# Patient Record
Sex: FEMALE | Race: OTHER | NOT HISPANIC OR LATINO | URBAN - METROPOLITAN AREA
[De-identification: names, ages, dates, MRNs, and addresses within clinical notes are randomized per-mention and may not be internally consistent; named-entity substitution may affect disease eponyms.]

---

## 2022-04-11 ENCOUNTER — TELEPHONE (OUTPATIENT)
Dept: OBGYN CLINIC | Facility: HOSPITAL | Age: 52
End: 2022-04-11

## 2022-10-07 ENCOUNTER — TELEPHONE (OUTPATIENT)
Dept: OBGYN CLINIC | Facility: HOSPITAL | Age: 52
End: 2022-10-07

## 2022-10-07 NOTE — TELEPHONE ENCOUNTER
Patient is being referred to a hand specialist  Please schedule accordingly  Lt trigger fgr  Patient did have an injection that worked for about a month      [address]   [phone]

## 2022-10-14 ENCOUNTER — TELEPHONE (OUTPATIENT)
Dept: OBGYN CLINIC | Facility: HOSPITAL | Age: 52
End: 2022-10-14

## 2022-10-14 NOTE — TELEPHONE ENCOUNTER
Caller: Patient    Doctor: Fabiana Dan    Reason for call: Patient would like to reschedule the 9am appt for today       Call back#: [phone]

## 2022-10-17 ENCOUNTER — OFFICE VISIT (OUTPATIENT)
Dept: OBGYN CLINIC | Facility: CLINIC | Age: 52
End: 2022-10-17
Payer: COMMERCIAL

## 2022-10-17 VITALS
DIASTOLIC BLOOD PRESSURE: 62 MMHG | WEIGHT: 162 LBS | HEIGHT: 65 IN | BODY MASS INDEX: 26.99 KG/M2 | SYSTOLIC BLOOD PRESSURE: 118 MMHG

## 2022-10-17 DIAGNOSIS — G89.29 CHRONIC RIGHT-SIDED LOW BACK PAIN WITHOUT SCIATICA: ICD-10-CM

## 2022-10-17 DIAGNOSIS — M54.50 CHRONIC RIGHT-SIDED LOW BACK PAIN WITHOUT SCIATICA: ICD-10-CM

## 2022-10-17 DIAGNOSIS — M25.50 ARTHRALGIA, UNSPECIFIED JOINT: ICD-10-CM

## 2022-10-17 DIAGNOSIS — M25.551 BILATERAL HIP PAIN: ICD-10-CM

## 2022-10-17 DIAGNOSIS — G89.29 CHRONIC PAIN OF RIGHT KNEE: ICD-10-CM

## 2022-10-17 DIAGNOSIS — M25.561 CHRONIC PAIN OF RIGHT KNEE: ICD-10-CM

## 2022-10-17 DIAGNOSIS — M25.552 BILATERAL HIP PAIN: ICD-10-CM

## 2022-10-17 DIAGNOSIS — M65.311 TRIGGER FINGER OF RIGHT THUMB: ICD-10-CM

## 2022-10-17 PROCEDURE — 99244 OFF/OP CNSLTJ NEW/EST MOD 40: CPT | Performed by: EMERGENCY MEDICINE

## 2022-10-17 NOTE — PROGRESS NOTES
Assessment/Plan:    Diagnoses and all orders for this visit:    Chronic right-sided low back pain without sciatica  -     Ambulatory Referral to Orthopedic Surgery    Bilateral hip pain  -     Ambulatory Referral to Orthopedic Surgery    Chronic pain of right knee  -     Ambulatory Referral to Orthopedic Surgery    Trigger finger of right thumb  -     Ambulatory Referral to Orthopedic Surgery    Arthralgia, unspecified joint  -     Ambulatory Referral to Orthopedic Surgery    It appears that the patient's symptoms could be related to a systemic cause such as autoimmune issue  She does have a rheumatology appointment next week as scheduled  At this point in time I have recommended regular short-term use of anti-inflammatories to help with her pain  Reviewed x-rays showing mild degenerative changes and no obvious cause for the patient's pain  Return in about 4 weeks (around 11/14/2022), or if symptoms worsen or fail to improve  Chief Complaint:     Chief Complaint   Patient presents with   • Right Shoulder - Pain   • Spine - Pain   • Right Hip - Pain       Subjective:   Patient ID: Lucy Julian is a 46 y o  female  NP presents referred by PCP Dr Samanta Loredo for multiple issues ongoing for >1 year  She notes and bilateral hip and groin pain  Lab work was previously obtained showing a positive FLOR and anti micro some all antibody  She is scheduled to see Rheumatology next week  She has tried Aleve or IBU once per week but not consistently  Review of Systems    The following portions of the patient's chart were reviewed and updated as appropriate: Allergy:  No Known Allergies    History reviewed  No pertinent past medical history  History reviewed  No pertinent surgical history      Social History     Socioeconomic History   • Marital status: Unknown     Spouse name: Not on file   • Number of children: Not on file   • Years of education: Not on file   • Highest education level: Not on file Occupational History   • Not on file   Tobacco Use   • Smoking status: Current Every Day Smoker     Packs/day: 0 50     Types: Cigarettes   • Smokeless tobacco: Current User   Substance and Sexual Activity   • Alcohol use: Not on file   • Drug use: Not on file   • Sexual activity: Not on file   Other Topics Concern   • Not on file   Social History Narrative   • Not on file     Social Determinants of Health     Financial Resource Strain: Not on file   Food Insecurity: Not on file   Transportation Needs: Not on file   Physical Activity: Not on file   Stress: Not on file   Social Connections: Not on file   Intimate Partner Violence: Not on file   Housing Stability: Not on file       History reviewed  No pertinent family history  Medications:    Current Outpatient Medications:   •  cyclobenzaprine (FLEXERIL) 5 mg tablet, Take 1 tablet (5 mg total) by mouth as needed in the morning and 1 tablet (5 mg total) as needed at noon and 1 tablet (5 mg total) as needed in the evening for muscle spasms  , Disp: 30 tablet, Rfl: 0  •  cyclobenzaprine (FLEXERIL) 5 mg tablet, Take 1 tablet (5 mg total) by mouth 3 (three) times a day as needed for muscle spasms for up to 30 doses, Disp: 30 tablet, Rfl: 0  •  hydrocortisone (ANUSOL-HC) 2 5 % rectal cream, Apply topically 2 (two) times a day, Disp: 60 g, Rfl: 3  •  levothyroxine 100 mcg tablet, TAKE 1 TABLET (100 MCG TOTAL) BY MOUTH IN THE MORNING , Disp: 90 tablet, Rfl: 0  •  meloxicam (Mobic) 15 mg tablet, Take 1 tablet (15 mg total) by mouth daily, Disp: 90 tablet, Rfl: 3  •  naproxen (NAPROSYN) 500 mg tablet, Take 1 tablet (500 mg total) by mouth 2 (two) times a day with meals, Disp: 20 tablet, Rfl: 0    There is no problem list on file for this patient        Objective:  /62   Ht 5' 5" (1 651 m)   Wt 73 5 kg (162 lb)   BMI 26 96 kg/m²     Right Hip Exam     Comments:  Hip and groin pain with external rotation      Back Exam     Range of Motion   The patient has normal back ROM  Other   Toe walk: normal  Heel walk: normal      Right Shoulder Exam     Range of Motion   Active abduction: normal   External rotation: normal   Internal rotation 0 degrees: abnormal     Muscle Strength   External rotation: 4/5   Supraspinatus: 5/5     Tests   Drop arm: negative      Left Shoulder Exam     Range of Motion   The patient has normal left shoulder ROM  Physical Exam      Neurologic Exam    Procedures    I have personally reviewed the written report of the pertinent studies  Labs reviewed showing FLOR and anti-microsomal ab    4/13/22  XR HIP 2 TO 3 VIEWS WITH PELVIS RIGHT    Anatomical Region Laterality Modality   Hip right Digital Radiography   Pelvis -- --   Thigh -- --   Lower Extremity -- --       Impression    IMPRESSION:     1  Lumbar lordosis is normal with preserved vertebral corpus heights and disc   spaces  Mild degenerative changes are noted  Facet joint arthrosis particularly   at lower lumbar levels  Lumbosacral alignment is normal  Nonobstructive   abdominal bowel gas pattern is present  2  No acute fracture or dislocation in the pelvis  SI joints unremarkable  Right   hip joint is intact with mild degenerative changes     3  Left hip joint is intact with mild degenerative changes     4  No acute fracture or dislocation in the right knee which shows mild   degenerative changes  Patella is in proper position with mild contour   irregularity and osteophyte formations superiorly

## 2022-10-17 NOTE — LETTER
October 17, 2022     Kaye Mariscal MD  [address] [address] [place]     Patient: Carolyn Chopra   YOB: 1970   Date of Visit: 10/17/2022       Dear Dr Tia Duenas: Thank you for referring Carolyn Chopra to me for evaluation  Below are the relevant portions of my assessment and plan of care  If you have questions, please do not hesitate to call me  I look forward to following Heather along with you           Sincerely,        Armand Smith MD        CC: No Recipients

## 2022-10-20 NOTE — PROGRESS NOTES
No chief complaint on file  Referring Provider  Christiano Diaz Md  [address] [facility],  [address] [place] [address]     History of Present Illness:   Iveth Winters is a 46 y o  female with hypothyroidism seen in consultation at the request of ***  Levothyroxine Synthroid Levoxyl Tirosint    S/sx: palpitations, tremor, changes in hair/skin/nails, changes in menstrual cycle, diarrhea, constipation, sleep disturbance, anxiety/nervous feelings/mood changes, blurry vision or double vision  Surgery to the neck    Neck: changes in neck, changes in voice, dysphagia  Radiation exposure to head/neck/chest  Medications impacting the thyroid  Illnesses    Contrast  Fhx:   thyroid disorder-     There is no problem list on file for this patient  No past medical history on file  No past surgical history on file  No family history on file  Social History     Tobacco Use   • Smoking status: Current Every Day Smoker     Packs/day: 0 50     Types: Cigarettes   • Smokeless tobacco: Current User   Substance Use Topics   • Alcohol use: Not on file     No Known Allergies      Current Outpatient Medications:   •  cyclobenzaprine (FLEXERIL) 5 mg tablet, Take 1 tablet (5 mg total) by mouth as needed in the morning and 1 tablet (5 mg total) as needed at noon and 1 tablet (5 mg total) as needed in the evening for muscle spasms  , Disp: 30 tablet, Rfl: 0  •  cyclobenzaprine (FLEXERIL) 5 mg tablet, Take 1 tablet (5 mg total) by mouth 3 (three) times a day as needed for muscle spasms for up to 30 doses, Disp: 30 tablet, Rfl: 0  •  hydrocortisone (ANUSOL-HC) 2 5 % rectal cream, Apply topically 2 (two) times a day, Disp: 60 g, Rfl: 3  •  levothyroxine 100 mcg tablet, TAKE 1 TABLET (100 MCG TOTAL) BY MOUTH IN THE MORNING , Disp: 90 tablet, Rfl: 0  •  meloxicam (Mobic) 15 mg tablet, Take 1 tablet (15 mg total) by mouth daily, Disp: 90 tablet, Rfl: 3  •  naproxen (NAPROSYN) 500 mg tablet, Take 1 tablet (500 mg total) by mouth 2 (two) times a day with Patient called to follow up on oral therapy     Drug:   Gleevec    Date started  2/25/2022    Regimen:   400mg daily    Compliance:   With food? Yes   Taking same time every day? Yes, between 3:30-4:30pm  Keeping in original bottle? Yes  Taking with full glass of water? Yes  Compliant with allopurinol? Please see comments below.     Toxicity assessment:   Palpitations, abnormal heart rhythm, chest pain, sudden weight gain, dizziness--No  Fatigue--Yes; Patient still reports significant fatigue. She states she did not take her dose of allopurinol to see if that helped her fatigue, which she states it did. Patient states she will continue to take the allopurinol as directed.   Sensitivity to sun--No  Diarrhea--Patient reports having one episode of loose stool after breakfast today. Denies any fevers or any other GI symptoms. Patient notified to call office if increase in loose stool. Patient verbalized understanding.   Nausea--No  Flu like symptoms--No  Rash?--No  Swelling? Patient still reports intermittent eyelid swelling. Patient denies any worsening of symptoms. Patient states this side effect occurred the last time patient was on Gleevec. Patient denies swelling elsewhere.   Mood Change--No    Questions/Concerns:     Patient reports some joint pain over the weekend that was relieved with massage and warm compresses. Denies any joint pain today. Patient was encouraged to call if fatigue, eyelid swelling, diarrhea, joint pain or any other new symptoms develop or worsen. Patient verbalized understanding.     Will postpone to 4/4/22.   meals, Disp: 20 tablet, Rfl: 0  Review of Systems    Physical Exam:  There is no height or weight on file to calculate BMI  There were no vitals taken for this visit  Wt Readings from Last 3 Encounters:   10/17/22 73 5 kg (162 lb)   10/06/22 73 5 kg (162 lb)   04/11/22 76 7 kg (169 lb)       GEN: NAD  E/n/m nl facies, hearing intact bilat, tongue midline, lips nl  Eyes: no stare or proptosis, nl lids and conjunctiva, EOMI  Neck: trachea midline, thyroid NT to palpation, nl in size, no nodules or neck masses noted, no cervical LAD  CV; heart reg rate s1s2 nl, no m/r/g appreciated, no BABAK  Resp: CTAB, good effort  Ab+BS  Neuro: no tremor, 2+ DTRs in BUE  MS: no c/c in digits, moves all 4 ext, nl muscle bulk, gait nl  Skin: warm and dry, no palmar erythema  Ext: no c/c in digits, no edema bilaterally, 2+ DP and PT pulses bilat, no breaks in skin/ulcers on feet, sensation intact to monofilament testing on plantar surfaces bilat  Psych: nl mood and affect, no gross lapses in memory    DATA:  Labs:       Lab Results   Component Value Date    TSH 1 59 10/11/2022         Radiology    Impression:  No diagnosis found  Plan:    There are no diagnoses linked to this encounter  There are no diagnoses linked to this encounter  1  Hypothyroidism, likely due to ***:      Discussed with the patient and all questioned fully answered  She will call me if any problems arise          Karen Conway MD

## 2022-10-21 ENCOUNTER — CONSULT (OUTPATIENT)
Dept: ENDOCRINOLOGY | Facility: HOSPITAL | Age: 52
End: 2022-10-21
Payer: COMMERCIAL

## 2022-10-21 VITALS
DIASTOLIC BLOOD PRESSURE: 70 MMHG | HEART RATE: 83 BPM | WEIGHT: 165 LBS | BODY MASS INDEX: 27.49 KG/M2 | SYSTOLIC BLOOD PRESSURE: 120 MMHG | HEIGHT: 65 IN

## 2022-10-21 DIAGNOSIS — E06.3 HYPOTHYROIDISM DUE TO HASHIMOTO'S THYROIDITIS: Primary | ICD-10-CM

## 2022-10-21 DIAGNOSIS — N95.1 PERIMENOPAUSAL: ICD-10-CM

## 2022-10-21 DIAGNOSIS — E03.8 HYPOTHYROIDISM DUE TO HASHIMOTO'S THYROIDITIS: Primary | ICD-10-CM

## 2022-10-21 DIAGNOSIS — R76.8 THYROID ANTIBODY POSITIVE: ICD-10-CM

## 2022-10-21 PROCEDURE — 99203 OFFICE O/P NEW LOW 30 MIN: CPT | Performed by: STUDENT IN AN ORGANIZED HEALTH CARE EDUCATION/TRAINING PROGRAM

## 2022-10-21 NOTE — PROGRESS NOTES
CC: Evaluation of elevated anti-TPO titers     Referring Provider  Amberly Jean-Baptiste Md  [address] [facility],  [address] [place] [address]      History of Present Illness:   Patience Mendes is a 46 y o  female with hypothyroidism seen in consultation at the request of her PCP, Dr João Barillas  In a recent workup for arthralgias and myalgias, she was incidentally found to have elevated anti-TPO titers (319) and was subsequently referred to us for further evaluation  The patient endorses a history of hypothyroidism, first diagnosed approximately 15 years ago; she does not have access to these records, but reports that her TSH and thyroid hormone levels were found to be abnormal  Her PCP at the time initiated her on low-dose levothyroxine supplementation; this has steadily been increased over the years, and she has been at her current dose of 100 mcg daily for the past 1-2 years  She reports good compliance with her levothyroxine, and is taking it appropriately (waiting 30 minutes before eating, without iron-containing supplements, etc)  At the present time, her symptoms include palpitations, irritability, brain fog and difficulty concentrating, dry skin, hair loss, temperature intolerance (primarily cold intolerance, but occasional hot flashes as well), menstrual irregularity, and arthralgias/myalgias  She denies any constipation or diarrhea  She denies any changes in her neck, dysphagia, or compressive symptoms  She has no history of radiation treatment to the head/neck/chest, takes no medications that would affect her thyroid, and reports no recent illnesses or contrast exposure  Of note, the patient does have a strong family history of thyroid disease  Her father had thyroid cancer, though she is unsure of the type  Additionally, several of her sisters have a history of hypothyroidism as well  Patient Active Problem List   Diagnosis   • Hypothyroidism due to Hashimoto's thyroiditis      History reviewed   No pertinent past medical history  Past Surgical History:   Procedure Laterality Date   •  SECTION      1/3/1991   • REPEAT  SECTION      1992, 12/3/1997      Family History   Problem Relation Age of Onset   • Diabetes Mother    • Heart disease Mother    • Hypertension Mother    • Cataracts Mother    • Thyroid cancer Father    • Heart disease Sister    • Hypertension Sister    • Thyroid disease Sister    • Heart disease Sister    • Hypertension Sister    • Thyroid disease Sister    • Heart disease Sister    • Hypertension Sister    • Thyroid disease Sister    • Heart disease Brother    • Hypertension Brother    • Heart disease Brother    • Hypertension Brother    • Heart disease Brother    • Hypertension Brother    • Heart disease Brother    • Hypertension Brother    • Heart disease Brother    • Hypertension Brother      Social History     Tobacco Use   • Smoking status: Current Every Day Smoker     Packs/day: 0 50     Types: Cigarettes   • Smokeless tobacco: Current User   Substance Use Topics   • Alcohol use: Not on file     No Known Allergies      Current Outpatient Medications:   •  cyclobenzaprine (FLEXERIL) 5 mg tablet, Take 1 tablet (5 mg total) by mouth 3 (three) times a day as needed for muscle spasms for up to 30 doses, Disp: 30 tablet, Rfl: 0  •  hydrocortisone (ANUSOL-HC) 2 5 % rectal cream, Apply topically 2 (two) times a day, Disp: 60 g, Rfl: 3  •  levothyroxine 100 mcg tablet, TAKE 1 TABLET (100 MCG TOTAL) BY MOUTH IN THE MORNING , Disp: 90 tablet, Rfl: 0  •  meloxicam (Mobic) 15 mg tablet, Take 1 tablet (15 mg total) by mouth daily, Disp: 90 tablet, Rfl: 3  •  naproxen (NAPROSYN) 500 mg tablet, Take 1 tablet (500 mg total) by mouth 2 (two) times a day with meals, Disp: 20 tablet, Rfl: 0  •  cyclobenzaprine (FLEXERIL) 5 mg tablet, Take 1 tablet (5 mg total) by mouth as needed in the morning and 1 tablet (5 mg total) as needed at noon and 1 tablet (5 mg total) as needed in the evening for muscle spasms  (Patient not taking: Reported on 10/21/2022), Disp: 30 tablet, Rfl: 0     Review of Systems   Constitutional: Positive for appetite change and fatigue  Negative for unexpected weight change  HENT: Negative for sore throat, trouble swallowing and voice change  Eyes: Negative for visual disturbance  Respiratory: Positive for cough and shortness of breath  At baseline   Cardiovascular: Positive for palpitations  Negative for chest pain  Gastrointestinal: Negative for abdominal pain, constipation, diarrhea, nausea and vomiting  Endocrine: Positive for polyphagia  Negative for polydipsia and polyuria  Genitourinary: Negative for difficulty urinating and frequency  Musculoskeletal: Positive for arthralgias and myalgias  Diffuse   Skin: Negative for rash  Neurological: Negative for tremors, weakness, light-headedness and headaches  Psychiatric/Behavioral: Positive for dysphoric mood  Physical Exam:  Body mass index is 27 46 kg/m²  /70   Pulse 83   Ht 5' 5" (1 651 m)   Wt 74 8 kg (165 lb)   BMI 27 46 kg/m²    Wt Readings from Last 3 Encounters:   10/21/22 74 8 kg (165 lb)   10/17/22 73 5 kg (162 lb)   10/06/22 73 5 kg (162 lb)     Physical Exam  Vitals reviewed  Constitutional:       General: She is not in acute distress  Appearance: Normal appearance  She is not ill-appearing  HENT:      Head: Normocephalic  Eyes:      Extraocular Movements: Extraocular movements intact  Conjunctiva/sclera: Conjunctivae normal       Pupils: Pupils are equal, round, and reactive to light  Neck:      Thyroid: No thyroid mass, thyromegaly or thyroid tenderness  Cardiovascular:      Rate and Rhythm: Normal rate and regular rhythm  Heart sounds: Normal heart sounds  No murmur heard  Pulmonary:      Effort: Pulmonary effort is normal  No respiratory distress  Breath sounds: Normal breath sounds  Comments: Chronic cough  Abdominal:      General: Abdomen is flat  Bowel sounds are normal  There is no distension  Palpations: Abdomen is soft  Tenderness: There is no abdominal tenderness  Musculoskeletal:      Cervical back: Neck supple  No tenderness  Right lower leg: No edema  Left lower leg: No edema  Skin:     General: Skin is warm and dry  Capillary Refill: Capillary refill takes less than 2 seconds  Neurological:      General: No focal deficit present  Mental Status: She is alert and oriented to person, place, and time  Psychiatric:         Mood and Affect: Mood normal          Behavior: Behavior normal            DATA:  Labs: Anti-TPO: 319    Lab Results   Component Value Date    TSH 1 59 10/11/2022     Radiology    Impression:  1  Hypothyroidism due to Hashimoto's thyroiditis    2  Thyroid antibody positive    3  Perimenopausal       Plan:    Heather was seen today for hypothyroidism  Diagnoses and all orders for this visit:    Hypothyroidism due to Hashimoto's thyroiditis    Thyroid antibody positive  -     Ambulatory Referral to Endocrinology    Perimenopausal      1  Hypothyroidism secondary to hashimoto thyroditis:-patient's long-standing history of hypothyroidism, elevated TPO-Ab does not contribute to any of her symptoms, unless her thyroid control was inadequate  Patient has been euthyroid for the past 2 years on current dose of levothyroxine 100mcg  2  Perimenopause:- We did discuss that several of her symptoms could be related to perimenopause; in this case, further testing would not necessarily be helpful as labs are generally abnormal with high or low Estradiol/FSH during the perimenopausal phase  Perimenopause if associated with irregular menses, muscle aches, hair loss, fatigue, mood instability/depression/anxiety and so forth  Patient does not want to consider any medical or hormonal replacement for these symptoms currently  Therefore we do not have much to offer here    However, she should continue her work-up with her PCP and other specialists as indicated  We advised her to continue her current dose of levothyroxine (100 mcg daily); additionally, she should continue to have her TSH checked annually, by her PCP or otherwise  She may follow-up with us on an as-needed basis in the future  Discussed with the patient and all questioned fully answered  She will call me if any problems arise  I interviewed, took the history and examined the patient  I discussed the case with the Students and reviewed the Student's note  I have edited the note and changed plan as outlined above  I have placed the orders  I was present in the clinic and examined the patient      Bandar Mccray MD 10/24/22      Bandar Mccray MD

## 2022-10-24 PROBLEM — E03.8 HYPOTHYROIDISM DUE TO HASHIMOTO'S THYROIDITIS: Status: ACTIVE | Noted: 2022-10-24

## 2022-10-24 PROBLEM — E06.3 HYPOTHYROIDISM DUE TO HASHIMOTO'S THYROIDITIS: Status: ACTIVE | Noted: 2022-10-24

## 2022-11-28 ENCOUNTER — DOCUMENTATION (OUTPATIENT)
Dept: ENDOCRINOLOGY | Facility: HOSPITAL | Age: 52
End: 2022-11-28

## 2023-02-17 ENCOUNTER — HOSPITAL ENCOUNTER (OUTPATIENT)
Dept: CT IMAGING | Facility: HOSPITAL | Age: 53
End: 2023-02-17

## 2023-02-17 DIAGNOSIS — F17.200 SMOKER: ICD-10-CM

## 2023-03-28 ENCOUNTER — HOSPITAL ENCOUNTER (OUTPATIENT)
Dept: MAMMOGRAPHY | Facility: MEDICAL CENTER | Age: 53
Discharge: HOME | End: 2023-03-28

## 2023-03-28 ENCOUNTER — HOSPITAL ENCOUNTER (OUTPATIENT)
Dept: BONE DENSITY | Facility: MEDICAL CENTER | Age: 53
Discharge: HOME | End: 2023-03-28

## 2023-03-28 VITALS — HEIGHT: 66 IN | WEIGHT: 163 LBS | BODY MASS INDEX: 26.2 KG/M2

## 2023-03-28 DIAGNOSIS — Z12.31 OTHER SCREENING MAMMOGRAM: ICD-10-CM

## 2023-03-28 DIAGNOSIS — Z78.0 POSTMENOPAUSAL: ICD-10-CM

## 2023-08-23 ENCOUNTER — HOSPITAL ENCOUNTER (EMERGENCY)
Facility: HOSPITAL | Age: 53
Discharge: HOME | End: 2023-08-23
Attending: EMERGENCY MEDICINE
Payer: COMMERCIAL

## 2023-08-23 ENCOUNTER — APPOINTMENT (EMERGENCY)
Dept: RADIOLOGY | Facility: HOSPITAL | Age: 53
End: 2023-08-23
Payer: COMMERCIAL

## 2023-08-23 VITALS
SYSTOLIC BLOOD PRESSURE: 139 MMHG | WEIGHT: 165 LBS | HEART RATE: 75 BPM | OXYGEN SATURATION: 99 % | TEMPERATURE: 98.1 F | DIASTOLIC BLOOD PRESSURE: 71 MMHG | BODY MASS INDEX: 26.63 KG/M2 | RESPIRATION RATE: 18 BRPM

## 2023-08-23 DIAGNOSIS — M25.562 LEFT KNEE PAIN: Primary | ICD-10-CM

## 2023-08-23 PROCEDURE — 73564 X-RAY EXAM KNEE 4 OR MORE: CPT

## 2023-08-23 PROCEDURE — 99283 EMERGENCY DEPT VISIT LOW MDM: CPT

## 2023-08-23 RX ORDER — NAPROXEN 500 MG/1
500 TABLET ORAL ONCE
Status: COMPLETED | OUTPATIENT
Start: 2023-08-23 | End: 2023-08-23

## 2023-08-23 RX ORDER — NAPROXEN 500 MG/1
500 TABLET ORAL 2 TIMES DAILY WITH MEALS
Qty: 30 TABLET | Refills: 0 | Status: SHIPPED | OUTPATIENT
Start: 2023-08-23

## 2023-08-23 RX ORDER — ACETAMINOPHEN 325 MG/1
975 TABLET ORAL ONCE
Status: COMPLETED | OUTPATIENT
Start: 2023-08-23 | End: 2023-08-23

## 2023-08-23 RX ADMIN — ACETAMINOPHEN 975 MG: 325 TABLET, FILM COATED ORAL at 22:54

## 2023-08-23 RX ADMIN — NAPROXEN 500 MG: 500 TABLET ORAL at 22:54

## 2023-08-23 NOTE — Clinical Note
Parvin Wilkinson was seen and treated in our emergency department on 8/23/2023. Diagnosis:     Bernard  is off the rest of the shift today. She may return on this date: 08/26/2023         If you have any questions or concerns, please don't hesitate to call.       Mila Bocanegra MD    ______________________________           _______________          _______________  Hospital Representative                              Date                                Time

## 2023-08-23 NOTE — Clinical Note
Mary Garrido was seen and treated in our emergency department on 8/23/2023. Diagnosis:     Bernard  is off the rest of the shift today. She may return on this date: 08/24/2023         If you have any questions or concerns, please don't hesitate to call.       Norma Romero MD    ______________________________           _______________          _______________  Hospital Representative                              Date                                Time

## 2023-08-24 NOTE — DISCHARGE INSTRUCTIONS
Please keep your knee immobilized for comfort. You may apply ice for swelling. You may take naproxen once every 12 hours for pain, do not take advil or aleve with this medicine. You may also take tylenol every 4 hours as needed. We are referring you to orthopedics. You should receive a call in 2-3 days to schedule an appointment. If you do not hear from them, the phone number is listed above.

## 2023-08-24 NOTE — ED PROVIDER NOTES
History  Chief Complaint   Patient presents with   • Knee Pain     Pt heard pop in left knee. Now has pain and can't bend it. 42-year-old female past medical history of hypertension hypothyroidism presents ED complaining of left knee pain. Patient states that she was dancing 2 days ago following a [place] [facility] [place]. She had a minor injury to her left knee, she does not recall exactly the mechanism but felt mild pain. Was seen by a doctor there in [facility] [place], had x-rays done and was told that it was likely ligamentous injury and that she would need to have an MRI of her pain did not improve in a couple weeks. Patient just landed at [facility] prior to presenting to the ED and while there, her luggage was about to fall off the escalator when she lunged to catch it, she felt an acute pop in her left knee and has not been able to bear weight since. She is complaining of severe pain. Her pain is anterior and lateral.  No history of steroid use or antibiotics recently. No history of injuries to this knee prior. She has not taken any medication prior to presenting to the ED for pain control. Prior to Admission Medications   Prescriptions Last Dose Informant Patient Reported? Taking? buPROPion (Wellbutrin SR) 150 mg 12 hr tablet   No No   Sig: Take 1 tablet (150 mg total) by mouth 2 (two) times a day   cyclobenzaprine (FLEXERIL) 5 mg tablet   No No   Sig: Take 1 tablet (5 mg total) by mouth as needed in the morning and 1 tablet (5 mg total) as needed at noon and 1 tablet (5 mg total) as needed in the evening for muscle spasms.    Patient not taking: Reported on 10/21/2022   cyclobenzaprine (FLEXERIL) 5 mg tablet   No No   Sig: Take 1 tablet (5 mg total) by mouth 3 (three) times a day as needed for muscle spasms for up to 30 doses   hydrocortisone (ANUSOL-HC) 2.5 % rectal cream   No No   Sig: Apply topically 2 (two) times a day   levothyroxine 100 mcg tablet   No No   Sig: Take 1 tablet (100 mcg total) by mouth daily meloxicam (Mobic) 15 mg tablet   No No   Sig: Take 1 tablet (15 mg total) by mouth daily   naproxen (NAPROSYN) 500 mg tablet   No No   Sig: Take 1 tablet (500 mg total) by mouth 2 (two) times a day with meals   phentermine (ADIPEX-P) 37.5 MG tablet   No No   Sig: Take 1 tablet (37.5 mg total) by mouth daily   tobramycin (TOBREX) 0.3 % SOLN   No No   Sig: Administer 1 drop to both eyes every 4 (four) hours while awake   varenicline 0.5 MG X 11 & 1 MG X 42 tablet therapy pack   No No   Sig: TAKE 0.5MG DAILY FOR 3 DAYS, THEN 0.5MG TWICE A DAY FOR 4 DAYS, THEN 1 MG TWICE A DAY FOR 21 DAYS.       Facility-Administered Medications: None       Past Medical History:   Diagnosis Date   • Disease of thyroid gland        Past Surgical History:   Procedure Laterality Date   • AUGMENTATION MAMMAPLASTY Bilateral 2017    saline   •  SECTION      1/3/1991   • REPEAT  SECTION      1992, 12/3/1997       Family History   Problem Relation Age of Onset   • Diabetes Mother    • Heart disease Mother    • Hypertension Mother    • Cataracts Mother    • Thyroid cancer Father    • Heart disease Sister    • Hypertension Sister    • Thyroid disease Sister    • Heart disease Sister    • Hypertension Sister    • Thyroid disease Sister    • Heart disease Sister    • Hypertension Sister    • Thyroid disease Sister    • No Known Problems Daughter    • No Known Problems Maternal Grandmother    • No Known Problems Maternal Grandfather    • No Known Problems Paternal Grandmother    • No Known Problems Paternal Grandfather    • Heart disease Brother    • Hypertension Brother    • Heart disease Brother    • Hypertension Brother    • Heart disease Brother    • Hypertension Brother    • Heart disease Brother    • Hypertension Brother    • Heart disease Brother    • Hypertension Brother    • No Known Problems Son    • No Known Problems Son    • No Known Problems Maternal Aunt    • No Known Problems Maternal Aunt    • No Known Problems Maternal Aunt    • No Known Problems Paternal Aunt    • No Known Problems Paternal Aunt      I have reviewed and agree with the history as documented. E-Cigarette/Vaping   • E-Cigarette Use Never User      E-Cigarette/Vaping Substances   • Nicotine No    • THC No    • CBD No    • Flavoring No    • Other No    • Unknown No      Social History     Tobacco Use   • Smoking status: Every Day     Packs/day: 1.00     Years: 30.00     Total pack years: 30.00     Types: Cigarettes   • Smokeless tobacco: Current   Vaping Use   • Vaping Use: Never used        Review of Systems   Constitutional: Negative for chills and fever. HENT: Negative for ear pain and sore throat. Eyes: Negative for pain and visual disturbance. Respiratory: Negative for cough and shortness of breath. Cardiovascular: Negative for chest pain and palpitations. Gastrointestinal: Negative for abdominal pain and vomiting. Genitourinary: Negative for dysuria and hematuria. Musculoskeletal: Positive for arthralgias. Negative for back pain. Skin: Negative for color change and rash. Neurological: Negative for seizures and syncope. All other systems reviewed and are negative. Physical Exam  ED Triage Vitals   Temperature Pulse Respirations Blood Pressure SpO2   08/23/23 2215 08/23/23 2215 08/23/23 2215 08/23/23 2215 08/23/23 2215   98.1 °F (36.7 °C) 75 18 139/71 99 %      Temp src Heart Rate Source Patient Position - Orthostatic VS BP Location FiO2 (%)   -- -- -- -- --             Pain Score       08/23/23 2254       6             Orthostatic Vital Signs  Vitals:    08/23/23 2215   BP: 139/71   Pulse: 75       Physical Exam  Vitals and nursing note reviewed. Constitutional:       General: She is not in acute distress. Appearance: She is well-developed. HENT:      Head: Normocephalic and atraumatic. Eyes:      Conjunctiva/sclera: Conjunctivae normal.   Cardiovascular:      Rate and Rhythm: Normal rate and regular rhythm. Heart sounds: No murmur heard. Pulmonary:      Effort: Pulmonary effort is normal. No respiratory distress. Breath sounds: Normal breath sounds. Abdominal:      Palpations: Abdomen is soft. Tenderness: There is no abdominal tenderness. Musculoskeletal:         General: No swelling. Cervical back: Neck supple. Legs:       Comments: Tenderness to palpation of the L knee tot he L aspect and anterior superior aspect. Patient's most comfortable position is extended in a recliner chair. Pain significantly worse with passive flexion. Unable to preform anterior/posterior drawer test secondary to significant pain with manipulation. No displaced patella, no bony tenderness. Skin:     General: Skin is warm and dry. Capillary Refill: Capillary refill takes less than 2 seconds. Neurological:      Mental Status: She is alert. Psychiatric:         Mood and Affect: Mood normal.         ED Medications  Medications   naproxen (NAPROSYN) tablet 500 mg (500 mg Oral Given 8/23/23 2254)   acetaminophen (TYLENOL) tablet 975 mg (975 mg Oral Given 8/23/23 2254)       Diagnostic Studies  Results Reviewed     None                 XR knee 4+ vw left injury    (Results Pending)         Procedures  Procedures      ED Course                                       Medical Decision Making  60-year-old female past medical history of hypertension hypothyroidism presents ED complaining of left knee pain. DDx: Ligamentous injury of the knee (ACL vs PCL), meniscal injury (lateral), vs tendon rupture (less likely). X-rays completed and unremarkable for obvious osseous abnormality. Patient placed in a knee immobilizer and provided with crutches for ambulation. Referred patient to orthopedic surgery for follow-up of likely ligamentous knee injury. Discussed pain control with applying ice, resting, naproxen and Tylenol as needed.   Discussed strict return precautions to the ED for any worsening pain, redness around the area, fevers or chills or any other concerning symptoms. Patient understanding discharged home with son to follow-up with orthopedic surgery. Amount and/or Complexity of Data Reviewed  Radiology: ordered. Risk  OTC drugs. Prescription drug management. Disposition  Final diagnoses:   Left knee pain     Time reflects when diagnosis was documented in both MDM as applicable and the Disposition within this note     Time User Action Codes Description Comment    8/23/2023 11:18 PM Shamar Sin Add [W01.639] Left knee pain       ED Disposition     ED Disposition   Discharge    Condition   Stable    Date/Time   Wed Aug 23, 2023 11:23 PM    Comment   Bernard Roblero discharge to home/self care. Follow-up Information     Follow up With Specialties Details Why Contact Info Additional [address] [facility] Orthopedic Surgery Schedule an appointment as soon as possible for a visit   [address] [facility], [address] [place], [place], [address]  Use Entrance A           Discharge Medication List as of 8/23/2023 11:23 PM      START taking these medications    Details   !! naproxen (Naprosyn) 500 mg tablet Take 1 tablet (500 mg total) by mouth 2 (two) times a day with meals, Starting Wed 8/23/2023, Normal       !! - Potential duplicate medications found. Please discuss with provider. CONTINUE these medications which have NOT CHANGED    Details   buPROPion (Wellbutrin SR) 150 mg 12 hr tablet Take 1 tablet (150 mg total) by mouth 2 (two) times a day, Starting Tue 4/18/2023, Normal      !! cyclobenzaprine (FLEXERIL) 5 mg tablet Take 1 tablet (5 mg total) by mouth as needed in the morning and 1 tablet (5 mg total) as needed at noon and 1 tablet (5 mg total) as needed in the evening for muscle spasms. , Starting Tue 5/24/2022, Normal      !! cyclobenzaprine (FLEXERIL) 5 mg tablet Take 1 tablet (5 mg total) by mouth 3 (three) times a day as needed for muscle spasms for up to 30 doses, Starting Thu 10/6/2022, Normal      hydrocortisone (ANUSOL-HC) 2.5 % rectal cream Apply topically 2 (two) times a day, Starting u 10/6/2022, Normal      levothyroxine 100 mcg tablet Take 1 tablet (100 mcg total) by mouth daily, Starting u 6/1/2023, Normal      meloxicam (Mobic) 15 mg tablet Take 1 tablet (15 mg total) by mouth daily, Starting Mon 4/11/2022, Normal      !! naproxen (NAPROSYN) 500 mg tablet Take 1 tablet (500 mg total) by mouth 2 (two) times a day with meals, Starting Thu 10/6/2022, Normal      phentermine (ADIPEX-P) 37.5 MG tablet Take 1 tablet (37.5 mg total) by mouth daily, Starting Mon 4/10/2023, Until Sun 7/9/2023, Normal      tobramycin (TOBREX) 0.3 % SOLN Administer 1 drop to both eyes every 4 (four) hours while awake, Starting Thu 6/8/2023, Normal      varenicline 0.5 MG X 11 & 1 MG X 42 tablet therapy pack TAKE 0.5MG DAILY FOR 3 DAYS, THEN 0.5MG TWICE A DAY FOR 4 DAYS, THEN 1 MG TWICE A DAY FOR 21 DAYS., Normal       !! - Potential duplicate medications found. Please discuss with provider. PDMP Review     None           ED Provider  Attending physically available and evaluated Bernard Roblero. I managed the patient along with the ED Attending.     Electronically Signed by         Haseeb Bennett MD  08/24/23 6371

## 2023-08-25 ENCOUNTER — OFFICE VISIT (OUTPATIENT)
Dept: OBGYN CLINIC | Facility: CLINIC | Age: 53
End: 2023-08-25
Payer: COMMERCIAL

## 2023-08-25 VITALS
WEIGHT: 165 LBS | BODY MASS INDEX: 26.52 KG/M2 | HEIGHT: 66 IN | SYSTOLIC BLOOD PRESSURE: 130 MMHG | DIASTOLIC BLOOD PRESSURE: 80 MMHG

## 2023-08-25 DIAGNOSIS — M25.562 PAIN IN PATELLA, LEFT: Primary | ICD-10-CM

## 2023-08-25 DIAGNOSIS — M25.60 LIMITED JOINT RANGE OF MOTION (ROM): ICD-10-CM

## 2023-08-25 DIAGNOSIS — M25.562 LEFT KNEE PAIN: ICD-10-CM

## 2023-08-25 DIAGNOSIS — T14.90XA INJURY: ICD-10-CM

## 2023-08-25 LAB
DME PARACHUTE DELIVERY DATE REQUESTED: NORMAL
DME PARACHUTE DELIVERY DATE REQUESTED: NORMAL
DME PARACHUTE ITEM DESCRIPTION: NORMAL
DME PARACHUTE ITEM DESCRIPTION: NORMAL
DME PARACHUTE ORDER STATUS: NORMAL
DME PARACHUTE ORDER STATUS: NORMAL
DME PARACHUTE SUPPLIER NAME: NORMAL
DME PARACHUTE SUPPLIER NAME: NORMAL
DME PARACHUTE SUPPLIER PHONE: NORMAL
DME PARACHUTE SUPPLIER PHONE: NORMAL

## 2023-08-25 PROCEDURE — 99214 OFFICE O/P EST MOD 30 MIN: CPT | Performed by: FAMILY MEDICINE

## 2023-08-25 NOTE — LETTER
August 25, 2023     Patient: Lucas Herrera  YOB: 1970  Date of Visit: 8/25/2023      To Whom it May Concern:    Lucas Herrera is under my professional care. Bernard was seen in my office on 8/25/2023. Bernard may return to work with limitations no prolonged standing. Please allow frequent breaks; a 10-15 minute break every 2 hrs of work . Please allow Knee brace and crutches. Will re-evaluate in 1-2 weeks. If you have any questions or concerns, please don't hesitate to call.          Sincerely,          Jovan Lobo DO        CC: No Recipients

## 2023-08-25 NOTE — PATIENT INSTRUCTIONS
P. R.I.C.E. Treatment   WHAT YOU NEED TO KNOW:   What is P.R.I.C.E. treatment? P.R.I.C.E. treatment is a 5-step process used to decrease swelling and pain caused by an injury. P.R.I.C.E. stands for protect, rest, ice, compress, and elevate. Start P.R.I.C.E. within 24 to 48 hours of an injury. How do I use P.R.I.C.E. treatment? Protect  your injury from more damage. Support the injured area with a brace or splint. Your healthcare provider will tell you how long to use the brace or splint. Rest  your injured area as directed. You may need to stop using, or keep weight off, the injury for 48 hours or longer. Your healthcare provider may recommend crutches or another device. Return to your usual activities as directed. Apply ice  on your injured area for 15 to 20 minutes every 4 hours or as directed. Use an ice pack, or put crushed ice in a plastic bag. Cover the bag with a towel before you apply it to your skin. Ice helps prevent tissue damage and decreases swelling and pain. Compress  (keep pressure on) the injured area. Compression will help decrease swelling and support the injured area. Use an elastic bandage, air stirrup, splint, or sling as directed. If you use an elastic bandage, make sure the bandage is not too tight. You should be able to slip 2 fingers between the bandage and your skin. Elevate  the injured area above the level of your heart as often as you can. This will help decrease swelling and pain. Prop the injured area on pillows or blankets to keep it elevated comfortably. When should I seek immediate care? Your pain is severe. You have severe swelling or deformity. You have numbness in the injured area. When should I call my doctor? Your pain and swelling do not go away after a few days. You have questions or concerns about your condition or care. CARE AGREEMENT:   You have the right to help plan your care.  Learn about your health condition and how it may be treated. Discuss treatment options with your healthcare providers to decide what care you want to receive. You always have the right to refuse treatment. The above information is an  only. It is not intended as medical advice for individual conditions or treatments. Talk to your doctor, nurse or pharmacist before following any medical regimen to see if it is safe and effective for you. © Copyright [employer] 2022 Information is for End User's use only and may not be sold, redistributed or otherwise used for commercial purposes.

## 2023-08-25 NOTE — PROGRESS NOTES
Assessment:     1. Pain in patella, left  MRI knee left  wo contrast    T-Rom  Post Op Knee Brace      2. Left knee pain  Ambulatory Referral to Orthopedic Surgery    MRI knee left  wo contrast    T-Rom  Post Op Knee Brace      3. Limited joint range of motion (ROM)  MRI knee left  wo contrast    T-Rom  Post Op Knee Brace      4. Injury  MRI knee left  wo contrast    T-Rom  Post Op Knee Brace        Orders Placed This Encounter   Procedures   • T-Rom  Post Op Knee Brace   • MRI knee left  wo contrast        Impression:   Knee pain likely secondary to potential superior patella fracture vs. Distal Quadriceps tendon strain  . Conservative Management   We discussed different treatment options:  Reviewed emergency department documentation from 08/23/2023 for left knee pain  • Ice or Heat Therapy as needed 1-2 times daily for 10-20 minutes. As tolerated. •  Over the counter Tylenol and/or NSAIDs  as needed based off your Past Medical Hx. Please follow product label for dosing and maximum limits. •  A TROM was applied today. Please follow instructions discussed. TROM may be removed for daily hygiene. May utilize crutches to help with ambulation. TROM degrees: full extension to 30 degrees flexion    • Work note provided         Imaging   • Reviewed prior xrays obtain in Urgent or Emergency Department. These were reviewed in office with patient today. • 08/23/2023 left knee x-ray without acute osseous abnormality  • Pending MRI to r/o injury to patella     Procedure  • Not appropriate at this time. Shared decision making, patient agreeable to plan. Return for Follow up after completion of advanced imaging.     HPI:   Cathrine Cabot is a 48 y.o. female  who presents for evaluation of   Chief Complaint   Patient presents with   • Left Leg - Pain       Occupation: standing school cafeteria, Injury Related: Yes, Date of Injury:     Onset/Mechanism: 08/14/2023; patient was dancing and she felt a pop then had pain and  swelling, knee was improving with rest. Then on  2023 patient was going up the escalator with luggage and felt a pop and pain again. Location: medial /lateral jiont line and patellar tendon. Severity: Current severity: intense/ 10. Not scored   Pain described as: can be sharp   Radiation: denies. Provocative: knee flexion tibial interior rotation, WB  . Associated symptoms: no swelling . Denies any hx of fracture of affected limb. , Denies any surgical history of affected limb.  , Denies any new or changes to previous numbness/ tingling of affected limb., Denies any new or changes to previous weakness down into affected extremity.      Summary of treatment to-date:   • Knee immobilizer      Following History Reviewed and Updated     Past Medical History:   Diagnosis Date   • Disease of thyroid gland      Past Surgical History:   Procedure Laterality Date   • AUGMENTATION MAMMAPLASTY Bilateral     saline   •  SECTION      1/3/1991   • REPEAT  SECTION      1992, 12/3/1997     Family History   Problem Relation Age of Onset   • Diabetes Mother    • Heart disease Mother    • Hypertension Mother    • Cataracts Mother    • Thyroid cancer Father    • Heart disease Sister    • Hypertension Sister    • Thyroid disease Sister    • Heart disease Sister    • Hypertension Sister    • Thyroid disease Sister    • Heart disease Sister    • Hypertension Sister    • Thyroid disease Sister    • No Known Problems Daughter    • No Known Problems Maternal Grandmother    • No Known Problems Maternal Grandfather    • No Known Problems Paternal Grandmother    • No Known Problems Paternal Grandfather    • Heart disease Brother    • Hypertension Brother    • Heart disease Brother    • Hypertension Brother    • Heart disease Brother    • Hypertension Brother    • Heart disease Brother    • Hypertension Brother    • Heart disease Brother    • Hypertension Brother    • No Known Problems Son    • No Known Problems Son    • No Known Problems Maternal Aunt    • No Known Problems Maternal Aunt    • No Known Problems Maternal Aunt    • No Known Problems Paternal Aunt    • No Known Problems Paternal Aunt        Social History     Substance and Sexual Activity   Alcohol Use Not Currently     Social History     Substance and Sexual Activity   Drug Use Never     Social History     Tobacco Use   Smoking Status Every Day   • Packs/day: 1.00   • Years: 33.00   • Total pack years: 33.00   • Types: Cigarettes   Smokeless Tobacco Never       Social Determinants of Health     Tobacco Use: High Risk (8/25/2023)    Patient History    • Smoking Tobacco Use: Every Day    • Smokeless Tobacco Use: Never    • Passive Exposure: Not on file   Alcohol Use: Not on file   Financial Resource Strain: Not on file   Food Insecurity: Not on file   Transportation Needs: Not on file   Physical Activity: Not on file   Stress: Not on file   Social Connections: Not on file   Intimate Partner Violence: Not on file   Depression: Not on file   Housing Stability: Not on file        No Known Allergies    Review of Systems      Review of Systems   Constitutional: Negative for chills and fever. HENT: Negative for drooling and sneezing. Eyes: Negative for redness. Respiratory: Negative for cough and wheezing. Gastrointestinal: Negative for vomiting. Skin: Negative for rash. Psychiatric/Behavioral: Negative for behavioral problems. The patient is not nervous/anxious. All other systems negative. Physical Exam   Physical Exam    Vitals and nursing note reviewed. Constitutional:   Appearance. Normal Appearance. /80   Ht 5' 6" (1.676 m)   Wt 74.8 kg (165 lb)   LMP 08/09/2022   BMI 26.63 kg/m²     Body mass index is 26.63 kg/m². HENT:  Head: Atraumatic.   Nose: Nose normal  Eyes: Conjunctiva/sclera: Conjunctivae normal.  Cardiovascular:   Rate and Rhythm: Bilateral equal distal pulses  Pulmonary:   Effort: Pulmonary effort is normal  Skin:   General: Skin is warm and dry. Neurological:   General: No focal deficit present. Mental Status: Alert and oriented to person, place, and time.    Psychiatric:   Mood and Affect: mood normal.  Behavior: Behavior normal     Musculoskeletal Exam     Left Knee Exam     Comments:  INSPECTION  - Erythema: no  - Bruising: no  - Effusion: -  - Muscle atrophy: no    PALPATION:  - Increased warmth: no  - Crepitus: no    TENDERNESS:  - Quadriceps tendon: ++  - Patella: ++ superior pole reproducing chief complaint   - Patellar tendon: no  - Medial joint line: +  - Lateral joint line: no  - Tibial tubercle: no  - Pes anserine bursa: no  - Posterior knee: no    Range of Motion:  - Active/passive flexion: limited active flexion to 20, limited passive flexion to 40 (normal 135 degrees)  - Active/passive extension: Full  (normal 0 degrees)    Strength:  -Hip Flexion: 4/5 with discomfort at patella  - Knee Flexion: not assessed   - Knee Extension: 4-/5 no resistance applied, able to resist gravity with pain at superior patella   -Ankle Dorsiflexion: 5/5  -Ankle Plantarflexion: 5/5    PATELLA:  - Patellofemoral tracking: (observing the patella for smooth motion while the patient contracts the quadriceps muscle)  - Patellar Displacement: normal and symmetrical  - Patellar Tilt: normal and symmetrical  - Patellar Apprehension:   - Patellar Grind Gale's:     SPECIAL TEST:  - Anterior cruciate ligament (ACL): Lachman's negative  - Posterior cruciate ligament (PCL): Posterior drawer's negative  - Medial Collateral Ligament (MCL): Valgus laxity negative  - Lateral Collateral Ligament (LCL): Varus laxity negative  - Medial meniscus: Medial Slava's:negative  - Lateral meniscus: Lateral [facility]'s: negative    -Apley's  -Thessaly's:     NEUROVASCULAR:  -sensation to light touch  - Dorsalis pedis pulse: intact                      Procedures       Portions of the record may have been created with voice recognition software. Occasional wrong word or "sound alike" substitutions may have occurred due to the inherent limitations of voice recognition software. Please review the chart carefully and recognize, using context, where substitutions/typographical errors may have occurred.

## 2023-08-25 NOTE — ED ATTENDING ATTESTATION
8/23/2023  IJose M MD, saw and evaluated the patient. I have discussed the patient with the resident/non-physician practitioner and agree with the resident's/non-physician practitioner's findings, Plan of Care, and MDM as documented in the resident's/non-physician practitioner's note, except where noted. All available labs and Radiology studies were reviewed. I was present for key portions of any procedure(s) performed by the resident/non-physician practitioner and I was immediately available to provide assistance. At this point I agree with the current assessment done in the Emergency Department. I have conducted an independent evaluation of this patient a history and physical is as follows:    ED Course       59-year-old female with history of hypertension hypothyroidism presents with left knee pain. Patient was dancing 2 days ago following trip dysuria she had minor injury to her left knee was seen evaluated by Dr. Donte Grissom had x-rays done was told she had ligamentous injury. Would likely need to have an MRI to evaluate her knee in a couple weeks. Upon arrival to [facility] reported patient's luggage struck her knee reinjuring her left anterior lateral knee. Patient denies fevers chills erythema. Patient denies any swelling. Patient did states she felt a pop and has had issues with weightbearing ever since. Vital signs reviewed. Left lower extremities warm well perfused with normal sensation pulses patient point tenderness over the anterior lateral aspect of the knee patient is able to extend and raise knee off bed difficulty. Patient's range of motion restricted secondary to pain. No erythema no palpable effusion. Impression left knee pain.   Differential diagnosis: Fracture, sprain, strain, ligamentous injury, internal derangement of knee doubt septic arthritis doubt osteomyelitis    Plan to repeat x-rays to rule out occult fracture patient will be provided knee immobilizer and crutches for comfort follow-up with orthopedics as outpatient    Knee x-rays independently interpreted by me no acute fracture dislocation.   Critical Care Time  Procedures

## 2023-08-31 ENCOUNTER — TELEPHONE (OUTPATIENT)
Age: 53
End: 2023-08-31

## 2023-08-31 NOTE — TELEPHONE ENCOUNTER
Caller: Patient     Doctor: Anette Verma     Reason for call: Please fax MRI of left knee order to: [phone]     Needs this asap     Call back#: [phone]

## 2023-09-01 ENCOUNTER — HOSPITAL ENCOUNTER (OUTPATIENT)
Dept: MRI IMAGING | Facility: HOSPITAL | Age: 53
Discharge: HOME | End: 2023-09-01
Attending: FAMILY MEDICINE
Payer: COMMERCIAL

## 2023-09-01 DIAGNOSIS — M25.60 LIMITED JOINT RANGE OF MOTION (ROM): ICD-10-CM

## 2023-09-01 DIAGNOSIS — M25.562 LEFT KNEE PAIN: ICD-10-CM

## 2023-09-01 DIAGNOSIS — T14.90XA INJURY: ICD-10-CM

## 2023-09-01 DIAGNOSIS — M25.562 PAIN IN PATELLA, LEFT: ICD-10-CM

## 2023-09-01 PROCEDURE — G1004 CDSM NDSC: HCPCS

## 2023-09-01 PROCEDURE — 73721 MRI JNT OF LWR EXTRE W/O DYE: CPT

## 2023-09-06 ENCOUNTER — OFFICE VISIT (OUTPATIENT)
Dept: OBGYN CLINIC | Facility: CLINIC | Age: 53
End: 2023-09-06
Payer: COMMERCIAL

## 2023-09-06 VITALS
BODY MASS INDEX: 26.52 KG/M2 | SYSTOLIC BLOOD PRESSURE: 116 MMHG | WEIGHT: 165 LBS | HEIGHT: 66 IN | DIASTOLIC BLOOD PRESSURE: 60 MMHG

## 2023-09-06 DIAGNOSIS — S83.241A OTHER TEAR OF MEDIAL MENISCUS, CURRENT INJURY, RIGHT KNEE, INITIAL ENCOUNTER: Primary | ICD-10-CM

## 2023-09-06 DIAGNOSIS — T14.90XA INJURY: ICD-10-CM

## 2023-09-06 DIAGNOSIS — M25.562 PAIN IN PATELLA, LEFT: ICD-10-CM

## 2023-09-06 DIAGNOSIS — M25.562 ACUTE PAIN OF LEFT KNEE: ICD-10-CM

## 2023-09-06 PROCEDURE — 99213 OFFICE O/P EST LOW 20 MIN: CPT | Performed by: FAMILY MEDICINE

## 2023-09-06 NOTE — LETTER
September 6, 2023     Patient: Iain Gilbert  YOB: 1970  Date of Visit: 9/6/2023      To Whom it May Concern:    Iain Gilbert is under my professional care. Bernard was seen in my office on 9/6/2023. Bernard may return to work on 09/07/2023 without right lower extremity limitations . If you have any questions or concerns, please don't hesitate to call.          Sincerely,          Samuel Lobo DO        CC: No Recipients

## 2023-09-06 NOTE — PROGRESS NOTES
Assessment:     1. Acute pain of left knee  Ambulatory Referral to Orthopedic Surgery    Ambulatory Referral to Physical Therapy      2. Pain in patella, left  Ambulatory Referral to Orthopedic Surgery    Ambulatory Referral to Physical Therapy      3. Injury  Ambulatory Referral to Orthopedic Surgery    Ambulatory Referral to Physical Therapy      4. Other tear of medial meniscus, current injury, right knee, initial encounter  Ambulatory Referral to Orthopedic Surgery    Ambulatory Referral to Physical Therapy        Orders Placed This Encounter   Procedures   • Ambulatory Referral to Orthopedic Surgery   • Ambulatory Referral to Physical Therapy        Impression:   Knee pain likely secondary to medial meniscal injury, along with cartilage loss at the lateral patellar facet. Patient will follow-up with orthopedic surgeon for discussion on medial meniscal injury. Conservative Management   We discussed different treatment options:  Reviewed emergency department documentation from 08/23/2023 for left knee pain  • Ice or Heat Therapy as needed 1-2 times daily for 10-20 minutes. As tolerated. •  Over the counter Tylenol and/or NSAIDs  as needed based off your Past Medical Hx. Please follow product label for dosing and maximum limits. •  T ROM may be discontinued. May utilize knee compression sleeve for comfort   • Work note provided. Patient may return to work without limitations. Imaging   • Reviewed prior xrays obtain in Urgent or Emergency Department. These were reviewed in office with patient today. • 08/23/2023 left knee x-ray without acute osseous abnormality  • 09/01/2023: MRI left knee: Medial meniscal injury  IMPRESSION:  1. Complex posterior horn medial meniscus tear including a full-thickness component tear at the dorsal root exhibiting mild extrusion without flipped fragment. Associated small hemarthrosis. Lateral meniscus remains intact.   2. Degenerative change with cartilage loss as noted.    Procedure  • No Injection performed today. May consider future corticosteroid injection depending on clinical exam/imaging. Shared decision making, patient agreeable to plan. Return for Follow up with Orthopedic Surgeon. HPI:   Eliza Villarreal is a 48 y.o. female  who presents for evaluation of   Chief Complaint   Patient presents with   • Left Knee - Pain, Swelling, Numbness       Occupation: standing school cafeteria, Injury Related: Yes, Date of Injury:2023 and 2023     Onset/Mechanism: 2023; patient was dancing and she felt a pop then had pain and  swelling, knee was improving with rest. Then on  2023 patient was going up the escalator with luggage and felt a pop and pain again. Location: medial /lateral jiont line and patellar tendon. Severity: Current severity: 8/10  Pain described as: can be sharp   Radiation: denies. Provocative: knee flexion tibial interior rotation,  walking .   Associated symptoms: little swelling .     Denies any hx of fracture of affected limb. , Denies any surgical history of affected limb.  , Denies any new or changes to previous numbness/ tingling of affected limb., Denies any new or changes to previous weakness down into affected extremity.      Summary of treatment to-date:   • Knee immobilizer    Following History Reviewed and Updated     Past Medical History:   Diagnosis Date   • Disease of thyroid gland      Past Surgical History:   Procedure Laterality Date   • AUGMENTATION MAMMAPLASTY Bilateral 2017    saline   •  SECTION      1/3/1991   • REPEAT  SECTION      1992, 12/3/1997     Family History   Problem Relation Age of Onset   • Diabetes Mother    • Heart disease Mother    • Hypertension Mother    • Cataracts Mother    • Thyroid cancer Father    • Heart disease Sister    • Hypertension Sister    • Thyroid disease Sister    • Heart disease Sister    • Hypertension Sister    • Thyroid disease Sister    • Heart disease Sister    • Hypertension Sister    • Thyroid disease Sister    • No Known Problems Daughter    • No Known Problems Maternal Grandmother    • No Known Problems Maternal Grandfather    • No Known Problems Paternal Grandmother    • No Known Problems Paternal Grandfather    • Heart disease Brother    • Hypertension Brother    • Heart disease Brother    • Hypertension Brother    • Heart disease Brother    • Hypertension Brother    • Heart disease Brother    • Hypertension Brother    • Heart disease Brother    • Hypertension Brother    • No Known Problems Son    • No Known Problems Son    • No Known Problems Maternal Aunt    • No Known Problems Maternal Aunt    • No Known Problems Maternal Aunt    • No Known Problems Paternal Aunt    • No Known Problems Paternal Aunt        Social History     Substance and Sexual Activity   Alcohol Use Not Currently     Social History     Substance and Sexual Activity   Drug Use Never     Social History     Tobacco Use   Smoking Status Every Day   • Packs/day: 1.00   • Years: 33.00   • Total pack years: 33.00   • Types: Cigarettes   Smokeless Tobacco Never       Social Determinants of Health     Tobacco Use: High Risk (9/6/2023)    Patient History    • Smoking Tobacco Use: Every Day    • Smokeless Tobacco Use: Never    • Passive Exposure: Not on file   Alcohol Use: Not on file   Financial Resource Strain: Not on file   Food Insecurity: Not on file   Transportation Needs: Not on file   Physical Activity: Not on file   Stress: Not on file   Social Connections: Not on file   Intimate Partner Violence: Not on file   Depression: Not on file   Housing Stability: Not on file        No Known Allergies    Review of Systems      Review of Systems   Review of Systems   Constitutional: Negative for chills and fever. HENT: Negative for drooling and sneezing. Eyes: Negative for redness. Respiratory: Negative for cough and wheezing. Gastrointestinal: Negative for vomiting. Psychiatric/Behavioral: Negative for behavioral problems. The patient is not nervous/anxious. All other systems negative. Physical Exam   Physical Exam    Vitals and nursing note reviewed. Constitutional:   Appearance. Normal Appearance. /60   Ht 5' 6" (1.676 m)   Wt 74.8 kg (165 lb)   LMP 08/09/2022   BMI 26.63 kg/m²     Body mass index is 26.63 kg/m². HENT:  Head: Atraumatic. Nose: Nose normal  Eyes: Conjunctiva/sclera: Conjunctivae normal.  Cardiovascular:   Rate and Rhythm: Bilateral equal distal pulses  Pulmonary:   Effort: Pulmonary effort is normal  Skin:   General: Skin is warm and dry. Neurological:   General: No focal deficit present. Mental Status: Alert and oriented to person, place, and time.    Psychiatric:   Mood and Affect: mood normal.  Behavior: Behavior normal     Musculoskeletal Exam     Ortho Exam     Left Knee Exam      Comments:  INSPECTION  - Erythema: no  - Bruising: no  - Effusion: no  - Muscle atrophy: no     PALPATION:  - Increased warmth: no  - Crepitus: no     TENDERNESS:  - Quadriceps tendon: mild  - Patella: neg.   - Patellar tendon: no  - Medial joint line: mild  - Lateral joint line: mild  - Tibial tubercle: mild on medial side  - Pes anserine bursa: no  - Posterior knee: no     Range of Motion:  - Active/passive flexion: limited active flexion to 90, limited passive flexion to 100 (normal 135 degrees)  - Active/passive extension: Active + 10, Passive +5  (normal 0 degrees)     Strength:  -Hip Flexion: 4/5 with discomfort at quad tendon  - Knee Flexion: 4/5 with discomfort   - Knee Extension: 4-/5 with discomfort  -Ankle Dorsiflexion: 5/5  -Ankle Plantarflexion: 5/5     PATELLA:  - Patellofemoral tracking: (observing the patella for smooth motion while the patient contracts the quadriceps muscle)  - Patellar Displacement:   - Patellar Tilt:   - Patellar Apprehension:   - Patellar Grind Gale's:      SPECIAL TEST:  - Anterior cruciate ligament (ACL): Lachman's negative  - Posterior cruciate ligament (PCL): Posterior drawer's negative  - Medial Collateral Ligament (MCL): Valgus laxity negative  - Lateral Collateral Ligament (LCL): Varus laxity negative  - Medial meniscus: Medial Slava's:positive reproducing pain  - Lateral meniscus: Lateral [facility]'s: negative     -Apley's  -Thessaly's:      NEUROVASCULAR:  -sensation to light touch  - Dorsalis pedis pulse: intact         Procedures       Portions of the record may have been created with voice recognition software. Occasional wrong word or "sound alike" substitutions may have occurred due to the inherent limitations of voice recognition software. Please review the chart carefully and recognize, using context, where substitutions/typographical errors may have occurred.

## 2023-09-08 ENCOUNTER — TELEPHONE (OUTPATIENT)
Dept: OBGYN CLINIC | Facility: HOSPITAL | Age: 53
End: 2023-09-08

## 2023-09-08 NOTE — TELEPHONE ENCOUNTER
Caller: Patient    Doctor/Office: [facility]    CB#: [phone]      What needs to be faxed: RTW note    ATTN to: [place]    Fax#: [phone]      Documents were successfully e-faxed

## 2023-09-18 ENCOUNTER — OFFICE VISIT (OUTPATIENT)
Dept: OBGYN CLINIC | Facility: MEDICAL CENTER | Age: 53
End: 2023-09-18
Payer: COMMERCIAL

## 2023-09-18 VITALS
WEIGHT: 166 LBS | BODY MASS INDEX: 26.68 KG/M2 | HEIGHT: 66 IN | HEART RATE: 76 BPM | DIASTOLIC BLOOD PRESSURE: 73 MMHG | SYSTOLIC BLOOD PRESSURE: 108 MMHG

## 2023-09-18 DIAGNOSIS — T14.90XA INJURY: ICD-10-CM

## 2023-09-18 DIAGNOSIS — S83.241A OTHER TEAR OF MEDIAL MENISCUS, CURRENT INJURY, RIGHT KNEE, INITIAL ENCOUNTER: ICD-10-CM

## 2023-09-18 DIAGNOSIS — Z01.89 ENCOUNTER FOR LOWER EXTREMITY COMPARISON IMAGING STUDY: ICD-10-CM

## 2023-09-18 DIAGNOSIS — S83.242A ACUTE MEDIAL MENISCUS TEAR OF LEFT KNEE, INITIAL ENCOUNTER: Primary | ICD-10-CM

## 2023-09-18 DIAGNOSIS — M25.462 EFFUSION, LEFT KNEE: ICD-10-CM

## 2023-09-18 PROCEDURE — 99214 OFFICE O/P EST MOD 30 MIN: CPT | Performed by: ORTHOPAEDIC SURGERY

## 2023-09-18 NOTE — LETTER
September 18, 2023     Patient: Iain Gilbert  YOB: 1970  Date of Visit: 9/18/2023      To Whom it May Concern:    Iain Gilbert is under my professional care. Bernard was seen in my office on 9/18/2023. Please excuse Bernard from work until she is re evaluated in my office in 2 weeks. If you have any questions or concerns, please don't hesitate to call.          Sincerely,          Alexei Adams MD        CC: No Recipients

## 2023-09-18 NOTE — PROGRESS NOTES
Orthopaedic Surgery - Office Note  Bernard Roblero (66 y.o. female)   : 1970   MRN: [id number]  Encounter Date: 2023    No chief complaint on file. Assessment / Plan  Left knee posterior horn medial meniscus tear    · Discussion was had with the patient regarding her left medial meniscus tear. She was advised that she may try conservative treatment with physical therapy, but that there is a high likelihood of this to fail given the tear pattern. A arthroscopic partial meniscectomy vs. Repair would treat the mechanical symptoms that the patient is experiencing. The patient would like to try nonoperative treatment first prior to considering surgical intervention  · NSAIDs prn for pain  · Continue physical therapy for knee ROM and strengthening  · Return to clinic in 3 weeks for repeat evaluation. History of Present Illness  Bernard Wade is a 48 y.o. female who presents for evaluation of her left knee. She states that she was returning from her trip to [facility] when she twisted her knee on the escalator. She states she felt immediate pain and swelling of the knee. She has some numbness that radiates to her foot. Her pain is worse with deep flexion of the knee and walking on uneven surfaces. She has begun physical therapy which has helped some with her pain. She has had no injections and no prior surgeries to the right knee. Review of Systems  Pertinent items are noted in HPI. All other systems were reviewed and are negative. Physical Exam  LMP 2022   Cons: Appears well. No apparent distress. Psych: Alert. Oriented x3. Mood and affect normal.  Eyes: PERRLA, EOMI  Resp: Normal effort. No audible wheezing or stridor. CV: Palpable pulse. No discernable arrhythmia. No LE edema. Lymph:  No palpable cervical, axillary, or inguinal lymphadenopathy. Skin: Warm. No palpable masses. No visible lesions. Neuro: Normal muscle tone. Normal and symmetric DTR's.      Left Knee Exam  Alignment:  Normal knee alignment. Inspection:  left knee swelling. Palpation:  medial and lateral joint line tenderness and patellar tenderness. ROM:  Knee Extension 5 deg. Knee Flexion 70 deg. Strength:  5/5 quadriceps and hamstrings. Stability:  No objective knee instability. Stable Varus / Valgus stress, Lachman, and Posterior drawer. Tests:  (+) Kal. Patella:  Patella tracks centrally without crepitus. Neurovascular:  Sensation intact in DP/SP/Mireles/Sa/T nerve distributions. Palpable DP & PT pulses. Gait:  Normal.      Studies Reviewed  I have personally reviewed pertinent films in PACS and my interpretation is as follows. XR of left knee - demonstrates no fractures or dislocations. No joint space narrowing. MRI of left knee - demonstrates a complex tear of the medial meniscus posterior horn. Intact collateral and cruciate ligaments. Mild cartilage loss of the medial and patellofemoral compartments. Procedures  No procedures today. Medical, Surgical, Family, and Social History  The patient's medical history, family history, and social history, were reviewed and updated as appropriate.     Past Medical History:   Diagnosis Date   • Disease of thyroid gland        Past Surgical History:   Procedure Laterality Date   • AUGMENTATION MAMMAPLASTY Bilateral 2017    saline   •  SECTION      1/3/1991   • REPEAT  SECTION      1992, 12/3/1997       Family History   Problem Relation Age of Onset   • Diabetes Mother    • Heart disease Mother    • Hypertension Mother    • Cataracts Mother    • Thyroid cancer Father    • Heart disease Sister    • Hypertension Sister    • Thyroid disease Sister    • Heart disease Sister    • Hypertension Sister    • Thyroid disease Sister    • Heart disease Sister    • Hypertension Sister    • Thyroid disease Sister    • No Known Problems Daughter    • No Known Problems Maternal Grandmother    • No Known Problems Maternal Grandfather    • No Known Problems Paternal Grandmother    • No Known Problems Paternal Grandfather    • Heart disease Brother    • Hypertension Brother    • Heart disease Brother    • Hypertension Brother    • Heart disease Brother    • Hypertension Brother    • Heart disease Brother    • Hypertension Brother    • Heart disease Brother    • Hypertension Brother    • No Known Problems Son    • No Known Problems Son    • No Known Problems Maternal Aunt    • No Known Problems Maternal Aunt    • No Known Problems Maternal Aunt    • No Known Problems Paternal Aunt    • No Known Problems Paternal Aunt        Social History     Occupational History   • Not on file   Tobacco Use   • Smoking status: Every Day     Packs/day: 1.00     Years: 33.00     Total pack years: 33.00     Types: Cigarettes   • Smokeless tobacco: Never   Vaping Use   • Vaping Use: Never used   Substance and Sexual Activity   • Alcohol use: Not Currently   • Drug use: Never   • Sexual activity: Not on file       No Known Allergies      Current Outpatient Medications:   •  buPROPion (Wellbutrin SR) 150 mg 12 hr tablet, Take 1 tablet (150 mg total) by mouth 2 (two) times a day, Disp: 180 tablet, Rfl: 3  •  cyclobenzaprine (FLEXERIL) 5 mg tablet, Take 1 tablet (5 mg total) by mouth as needed in the morning and 1 tablet (5 mg total) as needed at noon and 1 tablet (5 mg total) as needed in the evening for muscle spasms.  (Patient not taking: Reported on 10/21/2022), Disp: 30 tablet, Rfl: 0  •  cyclobenzaprine (FLEXERIL) 5 mg tablet, Take 1 tablet (5 mg total) by mouth 3 (three) times a day as needed for muscle spasms for up to 30 doses, Disp: 30 tablet, Rfl: 0  •  hydrocortisone (ANUSOL-HC) 2.5 % rectal cream, Apply topically 2 (two) times a day, Disp: 60 g, Rfl: 3  •  levothyroxine 100 mcg tablet, Take 1 tablet (100 mcg total) by mouth daily, Disp: 90 tablet, Rfl: 3  •  meloxicam (Mobic) 15 mg tablet, Take 1 tablet (15 mg total) by mouth daily, Disp: 90 tablet, Rfl: 3  •  naproxen (NAPROSYN) 500 mg tablet, Take 1 tablet (500 mg total) by mouth 2 (two) times a day with meals, Disp: 20 tablet, Rfl: 0  •  naproxen (Naprosyn) 500 mg tablet, Take 1 tablet (500 mg total) by mouth 2 (two) times a day with meals, Disp: 30 tablet, Rfl: 0  •  olmesartan (BENICAR) 40 mg tablet, Take 1 tablet (40 mg total) by mouth daily, Disp: 90 tablet, Rfl: 3  •  phentermine (ADIPEX-P) 37.5 MG tablet, Take 1 tablet (37.5 mg total) by mouth daily, Disp: 90 tablet, Rfl: 0  •  tobramycin (TOBREX) 0.3 % SOLN, Administer 1 drop to both eyes every 4 (four) hours while awake, Disp: 5 mL, Rfl: 0  •  varenicline 0.5 MG X 11 & 1 MG X 42 tablet therapy pack, Use as directed, Disp: 53 each, Rfl: 0      Regina Hamilton MD    Scribe Attestation    I,:   am acting as a scribe while in the presence of the attending physician.:       I,:   personally performed the services described in this documentation    as scribed in my presence.:

## 2023-09-21 ENCOUNTER — TELEPHONE (OUTPATIENT)
Dept: OBGYN CLINIC | Facility: HOSPITAL | Age: 53
End: 2023-09-21

## 2023-09-21 NOTE — TELEPHONE ENCOUNTER
LVM for the patient that she would need to be seen in the office to sign paperwork and schedule surgery. Offered that if she called back we could see if there was an appointment for her to follow up sooner than 10/2. Left my direct callback number.

## 2023-09-21 NOTE — TELEPHONE ENCOUNTER
Caller: Patient    Doctor: Traci Mario    Reason for call: Patient would like to proceed with discussed sx.       Call back#: [phone]

## 2023-09-25 NOTE — TELEPHONE ENCOUNTER
Caller: Patient    Doctor: Irene Arboleda    Reason for call: Returned Mey's call. Checked Dr's schedule no avail appts prior to 10/2.  Requested cb from Kary Noriega    Call back#: [phone]

## 2023-09-25 NOTE — TELEPHONE ENCOUNTER
Spoke with the patient. Let her know that since there are no earlier appointments available she can discuss surgery with Dr. Rico Camp at her appointment on 10/2.

## 2023-10-02 ENCOUNTER — OFFICE VISIT (OUTPATIENT)
Dept: OBGYN CLINIC | Facility: MEDICAL CENTER | Age: 53
End: 2023-10-02
Payer: COMMERCIAL

## 2023-10-02 VITALS
HEART RATE: 71 BPM | SYSTOLIC BLOOD PRESSURE: 124 MMHG | DIASTOLIC BLOOD PRESSURE: 74 MMHG | HEIGHT: 66 IN | BODY MASS INDEX: 26.68 KG/M2 | WEIGHT: 166 LBS

## 2023-10-02 DIAGNOSIS — S83.242A ACUTE MEDIAL MENISCUS TEAR OF LEFT KNEE, INITIAL ENCOUNTER: Primary | ICD-10-CM

## 2023-10-02 PROCEDURE — 99213 OFFICE O/P EST LOW 20 MIN: CPT | Performed by: ORTHOPAEDIC SURGERY

## 2023-10-02 RX ORDER — CEFAZOLIN SODIUM 1 G/50ML
1000 SOLUTION INTRAVENOUS ONCE
OUTPATIENT
Start: 2023-10-02 | End: 2023-10-02

## 2023-10-02 RX ORDER — TRANEXAMIC ACID 10 MG/ML
1000 INJECTION, SOLUTION INTRAVENOUS ONCE
OUTPATIENT
Start: 2023-10-02 | End: 2023-10-02

## 2023-10-02 RX ORDER — CHLORHEXIDINE GLUCONATE ORAL RINSE 1.2 MG/ML
15 SOLUTION DENTAL ONCE
OUTPATIENT
Start: 2023-10-02 | End: 2023-10-02

## 2023-10-02 NOTE — LETTER
October 2, 2023     Patient: Gemma Gaucher  YOB: 1970  Date of Visit: 10/2/2023      To Whom it May Concern:    Gemma Gaucher is under my professional care. Bernard was seen in my office on 10/2/2023. Please excuse Bernard from work until she is re evaluated by our clinic. If you have any questions or concerns, please don't hesitate to call.          Sincerely,          Mario Salazar MD        CC: No Recipients

## 2023-10-02 NOTE — H&P (VIEW-ONLY)
Orthopaedic Surgery - Office Note  Bernard Roblero (39 y.o. female)   : 1970   MRN: [id number]  Encounter Date: 10/2/2023    No chief complaint on file. Assessment / Plan  Left knee posterior horn medial meniscus tear    · Discussion was had with the patient that given her failure to improve with conservative treatment and persistent mechanical symptoms in her left knee, she is indicated for arthroscopic left knee partial medial menisectomy vs. Possible root repair. The patient elected to proceed with arthroscopic treatment of her left knee. · Case request placed for 10/24/23  · Surgical consent obtained  · Continue NSAIDs PRN for pain control  · Follow up post op for wound check    History of Present Illness  Bernard Hinkle is a 48 y.o. female who presents for follow up for her left knee medial meniscus tear. She reports that she has attempted to treat her pain with physical therapy, NSAIDs and glucosamine supplementation since her last visit, but that she continues to have significant pain. The pain is localized to the posteromedial left knee and is associated with feelings of instability of the left knee on uneven surfaces and clicking. The patient reports that she has had one episode of her knee locking on her that spontaneously resolved. She has no numbness or tingling of the left lower extremity. Review of Systems  Pertinent items are noted in HPI. All other systems were reviewed and are negative. Physical Exam  LMP 2022   Cons: Appears well. No apparent distress. Psych: Alert. Oriented x3. Mood and affect normal.  Eyes: PERRLA, EOMI  Resp: Normal effort. No audible wheezing or stridor. CV: Palpable pulse. No discernable arrhythmia. Trace LE edema. Lymph:  No palpable cervical, axillary, or inguinal lymphadenopathy. Skin: Warm. No palpable masses. No visible lesions. Neuro: Normal muscle tone. Normal and symmetric DTR's.      Left Knee Exam  Alignment:  Normal knee alignment. Inspection:  moderate knee swelling. Palpation:  postero medial and anterolateral joint line tenderness. ROM:  Knee Extension 0 deg. Knee Flexion 110 deg. Strength:  5/5 quadriceps and hamstrings. Stability:  No objective knee instability. Stable Varus / Valgus stress, Lachman, and Posterior drawer. Tests:  (+) Kal. Patella:  Patella tracks centrally without crepitus. Neurovascular:  Sensation intact in DP/SP/Mireles/Sa/T nerve distributions. extremity warm and adequately perfused  Gait:  Normal.      Studies Reviewed  No studies to review    Procedures  No procedures today. Medical, Surgical, Family, and Social History  The patient's medical history, family history, and social history, were reviewed and updated as appropriate.     Past Medical History:   Diagnosis Date   • Disease of thyroid gland        Past Surgical History:   Procedure Laterality Date   • AUGMENTATION MAMMAPLASTY Bilateral 2017    saline   •  SECTION      1/3/1991   • REPEAT  SECTION      1992, 12/3/1997       Family History   Problem Relation Age of Onset   • Diabetes Mother    • Heart disease Mother    • Hypertension Mother    • Cataracts Mother    • Thyroid cancer Father    • Heart disease Sister    • Hypertension Sister    • Thyroid disease Sister    • Heart disease Sister    • Hypertension Sister    • Thyroid disease Sister    • Heart disease Sister    • Hypertension Sister    • Thyroid disease Sister    • No Known Problems Daughter    • No Known Problems Maternal Grandmother    • No Known Problems Maternal Grandfather    • No Known Problems Paternal Grandmother    • No Known Problems Paternal Grandfather    • Heart disease Brother    • Hypertension Brother    • Heart disease Brother    • Hypertension Brother    • Heart disease Brother    • Hypertension Brother    • Heart disease Brother    • Hypertension Brother    • Heart disease Brother    • Hypertension Brother    • No Known Problems Son    • No Known Problems Son    • No Known Problems Maternal Aunt    • No Known Problems Maternal Aunt    • No Known Problems Maternal Aunt    • No Known Problems Paternal Aunt    • No Known Problems Paternal Aunt        Social History     Occupational History   • Not on file   Tobacco Use   • Smoking status: Every Day     Packs/day: 1.00     Years: 33.00     Total pack years: 33.00     Types: Cigarettes   • Smokeless tobacco: Never   Vaping Use   • Vaping Use: Never used   Substance and Sexual Activity   • Alcohol use: Not Currently   • Drug use: Never   • Sexual activity: Not on file       No Known Allergies      Current Outpatient Medications:   •  buPROPion (Wellbutrin SR) 150 mg 12 hr tablet, Take 1 tablet (150 mg total) by mouth 2 (two) times a day, Disp: 180 tablet, Rfl: 3  •  cyclobenzaprine (FLEXERIL) 5 mg tablet, Take 1 tablet (5 mg total) by mouth as needed in the morning and 1 tablet (5 mg total) as needed at noon and 1 tablet (5 mg total) as needed in the evening for muscle spasms.  (Patient not taking: Reported on 10/21/2022), Disp: 30 tablet, Rfl: 0  •  cyclobenzaprine (FLEXERIL) 5 mg tablet, Take 1 tablet (5 mg total) by mouth 3 (three) times a day as needed for muscle spasms for up to 30 doses, Disp: 30 tablet, Rfl: 0  •  hydrocortisone (ANUSOL-HC) 2.5 % rectal cream, Apply topically 2 (two) times a day, Disp: 60 g, Rfl: 3  •  levothyroxine 100 mcg tablet, Take 1 tablet (100 mcg total) by mouth daily, Disp: 90 tablet, Rfl: 3  •  meloxicam (Mobic) 15 mg tablet, Take 1 tablet (15 mg total) by mouth daily, Disp: 90 tablet, Rfl: 3  •  naproxen (NAPROSYN) 500 mg tablet, Take 1 tablet (500 mg total) by mouth 2 (two) times a day with meals, Disp: 20 tablet, Rfl: 0  •  naproxen (Naprosyn) 500 mg tablet, Take 1 tablet (500 mg total) by mouth 2 (two) times a day with meals, Disp: 30 tablet, Rfl: 0  •  olmesartan (BENICAR) 40 mg tablet, Take 1 tablet (40 mg total) by mouth daily, Disp: 90 tablet, Rfl: 3  • phentermine (ADIPEX-P) 37.5 MG tablet, Take 1 tablet (37.5 mg total) by mouth daily, Disp: 90 tablet, Rfl: 0  •  tobramycin (TOBREX) 0.3 % SOLN, Administer 1 drop to both eyes every 4 (four) hours while awake, Disp: 5 mL, Rfl: 0  •  varenicline 0.5 MG X 11 & 1 MG X 42 tablet therapy pack, Use as directed, Disp: 53 each, Rfl: 0      Alessio Newton MD    Scribe Attestation    I,:   am acting as a scribe while in the presence of the attending physician.:       I,:   personally performed the services described in this documentation    as scribed in my presence.:

## 2023-10-02 NOTE — PROGRESS NOTES
Orthopaedic Surgery - Office Note  Bernard Roblero (40 y.o. female)   : 1970   MRN: [id number]  Encounter Date: 10/2/2023    No chief complaint on file. Assessment / Plan  Left knee posterior horn medial meniscus tear    · Discussion was had with the patient that given her failure to improve with conservative treatment and persistent mechanical symptoms in her left knee, she is indicated for arthroscopic left knee partial medial menisectomy vs. Possible root repair. The patient elected to proceed with arthroscopic treatment of her left knee. · Case request placed for 10/24/23  · Surgical consent obtained  · Continue NSAIDs PRN for pain control  · Follow up post op for wound check    History of Present Illness  Bernard Wilson is a 48 y.o. female who presents for follow up for her left knee medial meniscus tear. She reports that she has attempted to treat her pain with physical therapy, NSAIDs and glucosamine supplementation since her last visit, but that she continues to have significant pain. The pain is localized to the posteromedial left knee and is associated with feelings of instability of the left knee on uneven surfaces and clicking. The patient reports that she has had one episode of her knee locking on her that spontaneously resolved. She has no numbness or tingling of the left lower extremity. Review of Systems  Pertinent items are noted in HPI. All other systems were reviewed and are negative. Physical Exam  LMP 2022   Cons: Appears well. No apparent distress. Psych: Alert. Oriented x3. Mood and affect normal.  Eyes: PERRLA, EOMI  Resp: Normal effort. No audible wheezing or stridor. CV: Palpable pulse. No discernable arrhythmia. Trace LE edema. Lymph:  No palpable cervical, axillary, or inguinal lymphadenopathy. Skin: Warm. No palpable masses. No visible lesions. Neuro: Normal muscle tone. Normal and symmetric DTR's.      Left Knee Exam  Alignment:  Normal knee alignment. Inspection:  moderate knee swelling. Palpation:  postero medial and anterolateral joint line tenderness. ROM:  Knee Extension 0 deg. Knee Flexion 110 deg. Strength:  5/5 quadriceps and hamstrings. Stability:  No objective knee instability. Stable Varus / Valgus stress, Lachman, and Posterior drawer. Tests:  (+) Kal. Patella:  Patella tracks centrally without crepitus. Neurovascular:  Sensation intact in DP/SP/Mireels/Sa/T nerve distributions. extremity warm and adequately perfused  Gait:  Normal.      Studies Reviewed  No studies to review    Procedures  No procedures today. Medical, Surgical, Family, and Social History  The patient's medical history, family history, and social history, were reviewed and updated as appropriate.     Past Medical History:   Diagnosis Date   • Disease of thyroid gland        Past Surgical History:   Procedure Laterality Date   • AUGMENTATION MAMMAPLASTY Bilateral 2017    saline   •  SECTION      1/3/1991   • REPEAT  SECTION      1992, 12/3/1997       Family History   Problem Relation Age of Onset   • Diabetes Mother    • Heart disease Mother    • Hypertension Mother    • Cataracts Mother    • Thyroid cancer Father    • Heart disease Sister    • Hypertension Sister    • Thyroid disease Sister    • Heart disease Sister    • Hypertension Sister    • Thyroid disease Sister    • Heart disease Sister    • Hypertension Sister    • Thyroid disease Sister    • No Known Problems Daughter    • No Known Problems Maternal Grandmother    • No Known Problems Maternal Grandfather    • No Known Problems Paternal Grandmother    • No Known Problems Paternal Grandfather    • Heart disease Brother    • Hypertension Brother    • Heart disease Brother    • Hypertension Brother    • Heart disease Brother    • Hypertension Brother    • Heart disease Brother    • Hypertension Brother    • Heart disease Brother    • Hypertension Brother    • No Known Problems Son    • No Known Problems Son    • No Known Problems Maternal Aunt    • No Known Problems Maternal Aunt    • No Known Problems Maternal Aunt    • No Known Problems Paternal Aunt    • No Known Problems Paternal Aunt        Social History     Occupational History   • Not on file   Tobacco Use   • Smoking status: Every Day     Packs/day: 1.00     Years: 33.00     Total pack years: 33.00     Types: Cigarettes   • Smokeless tobacco: Never   Vaping Use   • Vaping Use: Never used   Substance and Sexual Activity   • Alcohol use: Not Currently   • Drug use: Never   • Sexual activity: Not on file       No Known Allergies      Current Outpatient Medications:   •  buPROPion (Wellbutrin SR) 150 mg 12 hr tablet, Take 1 tablet (150 mg total) by mouth 2 (two) times a day, Disp: 180 tablet, Rfl: 3  •  cyclobenzaprine (FLEXERIL) 5 mg tablet, Take 1 tablet (5 mg total) by mouth as needed in the morning and 1 tablet (5 mg total) as needed at noon and 1 tablet (5 mg total) as needed in the evening for muscle spasms.  (Patient not taking: Reported on 10/21/2022), Disp: 30 tablet, Rfl: 0  •  cyclobenzaprine (FLEXERIL) 5 mg tablet, Take 1 tablet (5 mg total) by mouth 3 (three) times a day as needed for muscle spasms for up to 30 doses, Disp: 30 tablet, Rfl: 0  •  hydrocortisone (ANUSOL-HC) 2.5 % rectal cream, Apply topically 2 (two) times a day, Disp: 60 g, Rfl: 3  •  levothyroxine 100 mcg tablet, Take 1 tablet (100 mcg total) by mouth daily, Disp: 90 tablet, Rfl: 3  •  meloxicam (Mobic) 15 mg tablet, Take 1 tablet (15 mg total) by mouth daily, Disp: 90 tablet, Rfl: 3  •  naproxen (NAPROSYN) 500 mg tablet, Take 1 tablet (500 mg total) by mouth 2 (two) times a day with meals, Disp: 20 tablet, Rfl: 0  •  naproxen (Naprosyn) 500 mg tablet, Take 1 tablet (500 mg total) by mouth 2 (two) times a day with meals, Disp: 30 tablet, Rfl: 0  •  olmesartan (BENICAR) 40 mg tablet, Take 1 tablet (40 mg total) by mouth daily, Disp: 90 tablet, Rfl: 3  • phentermine (ADIPEX-P) 37.5 MG tablet, Take 1 tablet (37.5 mg total) by mouth daily, Disp: 90 tablet, Rfl: 0  •  tobramycin (TOBREX) 0.3 % SOLN, Administer 1 drop to both eyes every 4 (four) hours while awake, Disp: 5 mL, Rfl: 0  •  varenicline 0.5 MG X 11 & 1 MG X 42 tablet therapy pack, Use as directed, Disp: 53 each, Rfl: 0      Nory Ventura MD    Scribe Attestation    I,:   am acting as a scribe while in the presence of the attending physician.:       I,:   personally performed the services described in this documentation    as scribed in my presence.:

## 2023-10-12 ENCOUNTER — TELEPHONE (OUTPATIENT)
Age: 53
End: 2023-10-12

## 2023-10-12 NOTE — TELEPHONE ENCOUNTER
Caller: Patient    Doctor: Kim Sanchez    Reason for call:     Patient is calling about an extension for her employer, she had an appointment with  on 9/18/23, she gave extension paperwork because she is scheduled for surgery on 10/24/23 for left knee, meniscus and her employer needs ext paperwork asap to keep her out. Please call patient on the status of this, nothing in the log of the extension.     Call back#: [phone]

## 2023-10-13 RX ORDER — MULTIVITAMIN WITH IRON
TABLET ORAL DAILY
COMMUNITY

## 2023-10-13 RX ORDER — MELATONIN
1000 DAILY
COMMUNITY

## 2023-10-13 NOTE — PRE-PROCEDURE INSTRUCTIONS
Pre-Surgery Instructions:   Medication Instructions    cholecalciferol (VITAMIN D3) 1,000 units tablet Stop taking 7 days prior to surgery. COLLAGEN PO Stop taking 7 days prior to surgery. levothyroxine 100 mcg tablet Take day of surgery. meloxicam (Mobic) 15 mg tablet Stop taking 7 days prior to surgery. naproxen (NAPROSYN) 500 mg tablet Stop taking 7 days prior to surgery. olmesartan (BENICAR) 40 mg tablet Hold day of surgery. Omega-3 Fatty Acids (FISH OIL PO) Stop taking 7 days prior to surgery. ondansetron (Zofran ODT) 4 mg disintegrating tablet Uses PRN- OK to take day of surgery    varenicline (CHANTIX) 1 mg tablet Take day of surgery. vitamin B-12 (CYANOCOBALAMIN) 50 MCG tablet Stop taking 7 days prior to surgery. Medication instructions for day surgery reviewed. Please use only a sip of water to take your instructed medications. Avoid all over the counter vitamins, supplements and NSAIDS for one week prior to surgery per anesthesia guidelines. Tylenol is ok to take as needed. You will receive a call one business day prior to surgery with an arrival time and hospital directions. If your surgery is scheduled on a Monday, the hospital will be calling you on the Friday prior to your surgery. If you have not heard from anyone by 8pm, please call the hospital supervisor through the hospital  at [phone]. Concepcion Ulisses [phone]). Do not eat or drink anything after midnight the night before your surgery, including candy, mints, lifesavers, or chewing gum. Do not drink alcohol 24hrs before your surgery. Try not to smoke at least 24hrs before your surgery. Follow the pre surgery showering instructions as listed in the [facility] Surgical Experience Booklet” or otherwise provided by your surgeon's office. Do not shave the surgical area 24 hours before surgery.  Do not apply any lotions, creams, including makeup, cologne, deodorant, or perfumes after showering on the day of your surgery. No contact lenses, eye make-up, or artificial eyelashes. Remove nail polish, including gel polish, and any artificial, gel, or acrylic nails if possible. Remove all jewelry including rings and body piercing jewelry. Wear causal clothing that is easy to take on and off. Consider your type of surgery. Keep any valuables, jewelry, piercings at home. Please bring any specially ordered equipment (sling, braces) if indicated. Arrange for a responsible person to drive you to and from the hospital on the day of your surgery. Visitor Guidelines discussed. Call the surgeon's office with any new illnesses, exposures, or additional questions prior to surgery. Please reference your [facility] Surgical Experience Booklet” for additional information to prepare for your upcoming surgery.

## 2023-10-17 ENCOUNTER — TELEPHONE (OUTPATIENT)
Age: 53
End: 2023-10-17

## 2023-10-17 NOTE — TELEPHONE ENCOUNTER
Caller: Patient     Doctor/Office: Koffi García    #: [phone]      What needs to be faxed: medical leave forms from 10/4    ATTN to: Chemo Wallace     Fax#: [phone]

## 2023-10-17 NOTE — TELEPHONE ENCOUNTER
Patient called back and stated that the ppwk was faxed to the office number not the fax number. She is asking if you can please fax the ppwk again. Thank you.     Fax#: [phone]

## 2023-10-23 ENCOUNTER — ANESTHESIA EVENT (OUTPATIENT)
Dept: PERIOP | Facility: HOSPITAL | Age: 53
End: 2023-10-23
Payer: COMMERCIAL

## 2023-10-24 ENCOUNTER — HOSPITAL ENCOUNTER (OUTPATIENT)
Facility: HOSPITAL | Age: 53
Setting detail: OUTPATIENT SURGERY
Discharge: HOME | End: 2023-10-24
Attending: ORTHOPAEDIC SURGERY | Admitting: ORTHOPAEDIC SURGERY
Payer: COMMERCIAL

## 2023-10-24 ENCOUNTER — ANESTHESIA (OUTPATIENT)
Dept: PERIOP | Facility: HOSPITAL | Age: 53
End: 2023-10-24
Payer: COMMERCIAL

## 2023-10-24 VITALS
DIASTOLIC BLOOD PRESSURE: 83 MMHG | OXYGEN SATURATION: 98 % | BODY MASS INDEX: 27 KG/M2 | WEIGHT: 167.99 LBS | TEMPERATURE: 97.3 F | HEIGHT: 66 IN | SYSTOLIC BLOOD PRESSURE: 147 MMHG | HEART RATE: 94 BPM | RESPIRATION RATE: 16 BRPM

## 2023-10-24 DIAGNOSIS — Z98.890 S/P LEFT KNEE ARTHROSCOPY: Primary | ICD-10-CM

## 2023-10-24 DIAGNOSIS — R11.0 NAUSEA: ICD-10-CM

## 2023-10-24 PROBLEM — I10 HYPERTENSION: Status: ACTIVE | Noted: 2023-10-24

## 2023-10-24 PROBLEM — F17.200 SMOKING: Status: ACTIVE | Noted: 2023-10-24

## 2023-10-24 PROBLEM — IMO0001 SMOKING: Status: ACTIVE | Noted: 2023-10-24

## 2023-10-24 LAB
EXT PREGNANCY TEST URINE: NEGATIVE
EXT. CONTROL: NORMAL

## 2023-10-24 PROCEDURE — 81025 URINE PREGNANCY TEST: CPT | Performed by: ANESTHESIOLOGY

## 2023-10-24 PROCEDURE — 29882 ARTHRS KNE SRG MNISC RPR M/L: CPT | Performed by: ORTHOPAEDIC SURGERY

## 2023-10-24 DEVICE — BICEPS BUTTON
Type: IMPLANTABLE DEVICE | Site: KNEE | Status: FUNCTIONAL
Brand: ARTHREX®

## 2023-10-24 RX ORDER — EPHEDRINE SULFATE 50 MG/ML
INJECTION INTRAVENOUS AS NEEDED
Status: DISCONTINUED | OUTPATIENT
Start: 2023-10-24 | End: 2023-10-24

## 2023-10-24 RX ORDER — ROPIVACAINE HYDROCHLORIDE 5 MG/ML
INJECTION, SOLUTION EPIDURAL; INFILTRATION; PERINEURAL AS NEEDED
Status: DISCONTINUED | OUTPATIENT
Start: 2023-10-24 | End: 2023-10-24

## 2023-10-24 RX ORDER — OXYCODONE HYDROCHLORIDE 5 MG/1
5 TABLET ORAL EVERY 4 HOURS PRN
Qty: 30 TABLET | Refills: 0 | Status: SHIPPED | OUTPATIENT
Start: 2023-10-24

## 2023-10-24 RX ORDER — TRANEXAMIC ACID 10 MG/ML
1000 INJECTION, SOLUTION INTRAVENOUS ONCE
Status: DISCONTINUED | OUTPATIENT
Start: 2023-10-24 | End: 2023-10-24

## 2023-10-24 RX ORDER — ONDANSETRON 2 MG/ML
4 INJECTION INTRAMUSCULAR; INTRAVENOUS EVERY 6 HOURS PRN
Status: DISCONTINUED | OUTPATIENT
Start: 2023-10-24 | End: 2023-10-24 | Stop reason: HOSPADM

## 2023-10-24 RX ORDER — MIDAZOLAM HYDROCHLORIDE 2 MG/2ML
INJECTION, SOLUTION INTRAMUSCULAR; INTRAVENOUS AS NEEDED
Status: DISCONTINUED | OUTPATIENT
Start: 2023-10-24 | End: 2023-10-24

## 2023-10-24 RX ORDER — DEXAMETHASONE SODIUM PHOSPHATE 10 MG/ML
INJECTION, SOLUTION INTRAMUSCULAR; INTRAVENOUS AS NEEDED
Status: DISCONTINUED | OUTPATIENT
Start: 2023-10-24 | End: 2023-10-24

## 2023-10-24 RX ORDER — ALBUTEROL SULFATE 2.5 MG/3ML
2.5 SOLUTION RESPIRATORY (INHALATION) ONCE AS NEEDED
Status: DISCONTINUED | OUTPATIENT
Start: 2023-10-24 | End: 2023-10-24 | Stop reason: HOSPADM

## 2023-10-24 RX ORDER — ASPIRIN 81 MG/1
81 TABLET, CHEWABLE ORAL 2 TIMES DAILY
Qty: 28 TABLET | Refills: 0 | Status: SHIPPED | OUTPATIENT
Start: 2023-10-24 | End: 2023-11-07

## 2023-10-24 RX ORDER — LIDOCAINE HYDROCHLORIDE 20 MG/ML
INJECTION, SOLUTION EPIDURAL; INFILTRATION; INTRACAUDAL; PERINEURAL AS NEEDED
Status: DISCONTINUED | OUTPATIENT
Start: 2023-10-24 | End: 2023-10-24

## 2023-10-24 RX ORDER — CHLORHEXIDINE GLUCONATE ORAL RINSE 1.2 MG/ML
15 SOLUTION DENTAL ONCE
Status: COMPLETED | OUTPATIENT
Start: 2023-10-24 | End: 2023-10-24

## 2023-10-24 RX ORDER — CEFAZOLIN SODIUM 1 G/50ML
1000 SOLUTION INTRAVENOUS ONCE
Status: COMPLETED | OUTPATIENT
Start: 2023-10-24 | End: 2023-10-24

## 2023-10-24 RX ORDER — OXYCODONE HYDROCHLORIDE 10 MG/1
10 TABLET ORAL EVERY 4 HOURS PRN
Status: DISCONTINUED | OUTPATIENT
Start: 2023-10-24 | End: 2023-10-24 | Stop reason: HOSPADM

## 2023-10-24 RX ORDER — ONDANSETRON 4 MG/1
4 TABLET, ORALLY DISINTEGRATING ORAL EVERY 6 HOURS PRN
Qty: 12 TABLET | Refills: 0 | Status: SHIPPED | OUTPATIENT
Start: 2023-10-24

## 2023-10-24 RX ORDER — LIDOCAINE HCL/EPINEPHRINE/PF 2%-1:200K
VIAL (ML) INJECTION AS NEEDED
Status: DISCONTINUED | OUTPATIENT
Start: 2023-10-24 | End: 2023-10-24

## 2023-10-24 RX ORDER — ONDANSETRON 2 MG/ML
INJECTION INTRAMUSCULAR; INTRAVENOUS AS NEEDED
Status: DISCONTINUED | OUTPATIENT
Start: 2023-10-24 | End: 2023-10-24

## 2023-10-24 RX ORDER — ONDANSETRON 2 MG/ML
4 INJECTION INTRAMUSCULAR; INTRAVENOUS ONCE AS NEEDED
Status: DISCONTINUED | OUTPATIENT
Start: 2023-10-24 | End: 2023-10-24 | Stop reason: HOSPADM

## 2023-10-24 RX ORDER — SODIUM CHLORIDE, SODIUM LACTATE, POTASSIUM CHLORIDE, CALCIUM CHLORIDE 600; 310; 30; 20 MG/100ML; MG/100ML; MG/100ML; MG/100ML
125 INJECTION, SOLUTION INTRAVENOUS CONTINUOUS
Status: DISCONTINUED | OUTPATIENT
Start: 2023-10-24 | End: 2023-10-24

## 2023-10-24 RX ORDER — FENTANYL CITRATE/PF 50 MCG/ML
25 SYRINGE (ML) INJECTION
Status: DISCONTINUED | OUTPATIENT
Start: 2023-10-24 | End: 2023-10-24 | Stop reason: HOSPADM

## 2023-10-24 RX ORDER — MAGNESIUM HYDROXIDE 1200 MG/15ML
LIQUID ORAL AS NEEDED
Status: DISCONTINUED | OUTPATIENT
Start: 2023-10-24 | End: 2023-10-24 | Stop reason: HOSPADM

## 2023-10-24 RX ORDER — KETOROLAC TROMETHAMINE 30 MG/ML
INJECTION, SOLUTION INTRAMUSCULAR; INTRAVENOUS AS NEEDED
Status: DISCONTINUED | OUTPATIENT
Start: 2023-10-24 | End: 2023-10-24

## 2023-10-24 RX ORDER — OXYCODONE HYDROCHLORIDE 5 MG/1
5 TABLET ORAL EVERY 4 HOURS PRN
Status: DISCONTINUED | OUTPATIENT
Start: 2023-10-24 | End: 2023-10-24 | Stop reason: HOSPADM

## 2023-10-24 RX ORDER — PROPOFOL 10 MG/ML
INJECTION, EMULSION INTRAVENOUS AS NEEDED
Status: DISCONTINUED | OUTPATIENT
Start: 2023-10-24 | End: 2023-10-24

## 2023-10-24 RX ORDER — FENTANYL CITRATE 50 UG/ML
INJECTION, SOLUTION INTRAMUSCULAR; INTRAVENOUS AS NEEDED
Status: DISCONTINUED | OUTPATIENT
Start: 2023-10-24 | End: 2023-10-24

## 2023-10-24 RX ORDER — HYDROMORPHONE HCL/PF 1 MG/ML
0.5 SYRINGE (ML) INJECTION
Status: DISCONTINUED | OUTPATIENT
Start: 2023-10-24 | End: 2023-10-24 | Stop reason: HOSPADM

## 2023-10-24 RX ADMIN — FENTANYL CITRATE 25 MCG: 0.05 INJECTION, SOLUTION INTRAMUSCULAR; INTRAVENOUS at 11:44

## 2023-10-24 RX ADMIN — SODIUM CHLORIDE, SODIUM LACTATE, POTASSIUM CHLORIDE, AND CALCIUM CHLORIDE: .6; .31; .03; .02 INJECTION, SOLUTION INTRAVENOUS at 10:36

## 2023-10-24 RX ADMIN — CEFAZOLIN SODIUM 1000 MG: 1 SOLUTION INTRAVENOUS at 09:57

## 2023-10-24 RX ADMIN — SODIUM CHLORIDE, SODIUM LACTATE, POTASSIUM CHLORIDE, AND CALCIUM CHLORIDE 125 ML/HR: .6; .31; .03; .02 INJECTION, SOLUTION INTRAVENOUS at 08:30

## 2023-10-24 RX ADMIN — PROPOFOL 50 MG: 10 INJECTION, EMULSION INTRAVENOUS at 10:31

## 2023-10-24 RX ADMIN — FENTANYL CITRATE 25 MCG: 0.05 INJECTION, SOLUTION INTRAMUSCULAR; INTRAVENOUS at 11:39

## 2023-10-24 RX ADMIN — LIDOCAINE HYDROCHLORIDE AND EPINEPHRINE 20 ML: 20; 5 INJECTION, SOLUTION EPIDURAL; INFILTRATION; INTRACAUDAL; PERINEURAL at 09:36

## 2023-10-24 RX ADMIN — EPHEDRINE SULFATE 10 MG: 50 INJECTION, SOLUTION INTRAVENOUS at 10:23

## 2023-10-24 RX ADMIN — ONDANSETRON 4 MG: 2 INJECTION INTRAMUSCULAR; INTRAVENOUS at 10:02

## 2023-10-24 RX ADMIN — FENTANYL CITRATE 100 MCG: 50 INJECTION INTRAMUSCULAR; INTRAVENOUS at 09:34

## 2023-10-24 RX ADMIN — KETOROLAC TROMETHAMINE 30 MG: 30 INJECTION, SOLUTION INTRAMUSCULAR at 11:06

## 2023-10-24 RX ADMIN — CHLORHEXIDINE GLUCONATE 15 ML: 1.2 RINSE ORAL at 08:31

## 2023-10-24 RX ADMIN — EPHEDRINE SULFATE 10 MG: 50 INJECTION, SOLUTION INTRAVENOUS at 10:11

## 2023-10-24 RX ADMIN — OXYCODONE HYDROCHLORIDE 5 MG: 5 TABLET ORAL at 12:44

## 2023-10-24 RX ADMIN — PROPOFOL 200 MG: 10 INJECTION, EMULSION INTRAVENOUS at 10:02

## 2023-10-24 RX ADMIN — EPHEDRINE SULFATE 10 MG: 50 INJECTION, SOLUTION INTRAVENOUS at 10:20

## 2023-10-24 RX ADMIN — LIDOCAINE HYDROCHLORIDE 80 MG: 20 INJECTION, SOLUTION EPIDURAL; INFILTRATION; INTRACAUDAL at 10:02

## 2023-10-24 RX ADMIN — ROPIVACAINE HYDROCHLORIDE 30 ML: 5 INJECTION EPIDURAL; INFILTRATION; PERINEURAL at 09:36

## 2023-10-24 RX ADMIN — MIDAZOLAM 2 MG: 1 INJECTION INTRAMUSCULAR; INTRAVENOUS at 09:34

## 2023-10-24 RX ADMIN — DEXAMETHASONE SODIUM PHOSPHATE 4 MG: 10 INJECTION INTRAMUSCULAR; INTRAVENOUS at 10:02

## 2023-10-24 NOTE — ANESTHESIA POSTPROCEDURE EVALUATION
Post-Op Assessment Note    CV Status:  Stable    Pain management: adequate     Mental Status:  Alert and awake   Hydration Status:  Euvolemic   PONV Controlled:  Controlled   Airway Patency:  Patent      Post Op Vitals Reviewed: Yes      Staff: Anesthesiologist         No notable events documented.   /72   Pulse 90   Temp 97.8 °F (36.6 °C) (Temporal)   Resp 17   Ht 5' 6" (1.676 m)   Wt 76.2 kg (167 lb 15.9 oz)   LMP 08/09/2022   SpO2 96%   BMI 27.11 kg/m²

## 2023-10-24 NOTE — OP NOTE
OPERATIVE REPORT    PATIENT NAME: Iman Doty   :  1970  MRN: [id number]  Pt Location: [facility] OR ROOM 01    SURGERY DATE: 10/24/2023    SURGEON(S) and ROLE:  Primary: Dorota Salter MD  Assisting: Sacha Grajeda MD    NOTE:  I was present for the entire procedure and performed all essential portions of the surgery. PREOPERATIVE DIAGNOSES:  Left Knee  Medial Meniscus Posterior Root Tear    POSTOPERATIVE DIAGNOSES:  Left Knee  Same as Preoperative Diagnosis    PROCEDURES:  Left Knee Arthroscopy with:  Medial Meniscus Posterior Root Repair      ANESTHESIA TYPE:  General LMA and Intra-articular block    ANESTHESIA STAFF:   Anesthesiologist: Chilango Saravia MD  CRNA: Joesph Castor CRNA    ESTIMATED BLOOD LOSS:  5 mL    TOURNIQUET TIME:  Not used    PERIOPERATIVE ANTIBIOTICS:  cefazolin, 1 gram    IMPLANTS:  Arthrex Biceps button, #0 FiberWire (x2)    Implant Name Type Inv. Item Serial No.  Lot No. LRB No. Used Action   SUT BUTTON BICEP 2.6 X 12MM - [id number]  SUT BUTTON BICEP 2.6 X 12MM  [employer] [id number] Left 1 Implanted       SPECIMENS:  * No specimens in log *    DRAINS:  None      OPERATIVE INDICATIONS:  The patient is a 48 y.o. female with left knee pain and a medial meniscus posterior root tear. Surgical treatment was indicated due to persistent symptoms despite non-surgical treatment. After a thorough discussion of the potential risks, benefits, and alternative treatments, the patient agreed to proceed with surgery. The patient understands that the risks of surgery include, but are not limited to: failure of repair, infection, neurovascular injury, wound healing complications, venous thromboembolism, persistent pain, stiffness, instability, recurrence of symptoms, potential need for additional surgeries, and loss of limb or life. Oral and written consent for surgery was obtained from the patient preoperatively.       EXAM UNDER ANESTHESIA:  Neutral alignment  ROM:  0-135 degrees  Ligaments stable to varus stress / valgus stress / Lachman / posterior drawer; negative pivot-shift  Patella tracking normal  without crepitus. PROCEDURE AND TECHNIQUE:  On the day of surgery, the patient was identified in the preoperative holding area. The operative site was marked by the surgeon. The patient was taken into the operating room. A time-out was conducted to confirm the patient's identity, the operative site, and the proposed procedure. The patient was anesthetized, and perioperative antibiotics were administered. The patient was positioned supine on the OR table. All bony prominences were padded. A tourniquet was not used. The operative site was prepped and draped using standard sterile technique. An anterolateral portal was established, and the arthroscope was inserted into the knee joint. An anteromedial portal was established under direct visualization, and diagnostic arthroscopy was performed. The joint demonstrated mild synovitis. In the patellofemoral compartment, the trochlea demonstrated diffuse grade 1 chondral wear which required no treatment . The patella demonstrated diffuse grade 1 chondral wear which required no treatment . The patella tracking was normal.  .    In the medial compartment, the medial femoral condyle demonstrated diffuse grade 1 chondral wear which required no treatment . The medial tibial plateau demonstrated diffuse grade 1 chondral wear which required no treatment . The medial meniscus had a radial tear involving the posterior root. A meniscus root repair was performed. The root footprint was prepared with a 6 mm Flip Cutter socket. Two #0 FiberWire sutures were passed through the posterior root and retrieved through the Flip Cutter tunnel. The sutures were tied over a Biceps Button anteriorly. The meniscus root was reduced to its footprint and was stable to probing.     In the lateral compartment, the lateral femoral condyle demonstrated pristine articular cartilage. The lateral tibial plateau demonstrated pristine articular cartilage. The lateral meniscus was intact. .    In the intercondylar notch, the PCL was intact. The ACL was intact. At the conclusion of the procedure, the instruments were withdrawn. The portals and incisions were closed with absorbable sutures and steri-strips. A sterile dressing was applied. The surgical drapes were removed. A locked knee brace was applied to the operative knee. The patient was awakened from anesthesia and transported to the PACU in stable condition.       COMPLICATIONS:  None    PATIENT DISPOSITION:  PACU       SIGNATURE:  Pedro Laird MD  DATE:  October 24, 2023  TIME:  11:10 AM

## 2023-10-24 NOTE — ANESTHESIA PROCEDURE NOTES
Peripheral Block    Patient location during procedure: holding area  Start time: 10/24/2023 9:34 AM  Reason for block: at surgeon's request and post-op pain management  Staffing  Performed by: Julia Franz MD  Authorized by: Julia Franz MD    Preanesthetic Checklist  Completed: patient identified, IV checked, site marked, risks and benefits discussed, surgical consent, monitors and equipment checked, pre-op evaluation and timeout performed  Peripheral Block  Patient position: supine  Prep: ChloraPrep  Patient monitoring: heart rate, frequent blood pressure checks and continuous pulse oximetry  Anesthesia block type: intraarticular knee injection.   Laterality: left  Injection technique: single-shot  Needle  Needle gauge: 18 G  Needle localization: anatomical landmarks  Assessment  Injection assessment: frequent aspiration, injected with ease, negative aspiration, no paresthesia on injection and no symptoms of intraneural/intravenous injection  Paresthesia pain: none  Post-procedure:  site cleaned  patient tolerated the procedure well with no immediate complications

## 2023-10-24 NOTE — ANESTHESIA PREPROCEDURE EVALUATION
Procedure:  ARTHROSCOPY KNEE, meniscus repair vs partial meniscectomy (Left: Knee)    Relevant Problems   ANESTHESIA (within normal limits)      CARDIO   (+) Hypertension      ENDO   (+) Hypothyroidism due to Hashimoto's thyroiditis      GI/HEPATIC (within normal limits)      PULMONARY   (+) Smoking        Physical Exam    Airway    Mallampati score: I  TM Distance: >3 FB  Neck ROM: full     Dental        Cardiovascular  Cardiovascular exam normal    Pulmonary  Pulmonary exam normal     Other Findings      Anesthesia Plan  ASA Score- 2     Anesthesia Type- general with ASA Monitors. Additional Monitors:     Airway Plan: LMA. Comment: Intraarticular for postop pain control. Plan Factors-    Chart reviewed. Patient summary reviewed. Induction- intravenous. Postoperative Plan-     Informed Consent- Anesthetic plan and risks discussed with patient.

## 2023-10-24 NOTE — DISCHARGE INSTR - AVS FIRST PAGE
POSTOPERATIVE INSTRUCTIONS following KNEE SURGERY    MEDICATIONS:  Resume all home medications unless otherwise instructed by your surgeon. Pain Medication:  Oxycodone 5 mg, 1-3 tablets every 3 hours as needed  If you were given a regional anesthetic (nerve block), please begin taking the pain medication as soon as you get home, even if you have minimal or no pain. DO NOT WAIT FOR THE NERVE BLOCK TO WEAR OFF. Possible side effects include nausea, constipation, and urinary retention. If you experience these side effects, please call our office for assistance. Pain med refills are authorized only during office hours (8am-4pm, Mon-Fri). Anti-Inflammatory:  Naproxen 500 mg, 1 tablet every 12 hours for 4 weeks  Take with food. Stop if you experience nausea, reflux, or stomach pain. Nausea Medication:  Zofran ODT 4 mg, 1 tablet every 6 hours as needed  Fill prescription ONLY if you expericnce severe nausea. Blood Clot Prevention:  Aspirin 81 mg, 1 tablet twice daily for 2 weeks  Pump your foot up and down 20 times per hour while you are less mobile. WOUND CARE:  Keep the dressing clean and dry. Light drainage may occur the first 2 days postop. You may remove the dressings and get the incision wet in the shower 72 hours after surgery. DO NOT remove steri-strips or sutures. DO NOT immerse the incision under water. Carefully pat the incision dry. If there is wound drainage, re-apply a fresh dry gauze dressing. Please call our office ([phone]) if you experience either of the following:  Sudden increase in swelling, redness, or warmth at the surgical site  Excessive incisional drainage that persists beyond the 3rd day after surgery  Oral temperature greater than 101 degrees, not relieved with Tylenol  Shortness of breath, chest pain, nausea, or any other concerning symptoms    SWELLING CONTROL:  Cold Therapy:   The cold therapy device may be used either continuously or only as needed, according to your preference. Do not let the pad directly touch your skin. Alternatively, apply ice (20 min on, 20 min off) as often as you feel is necessary. Elevation:  Elevate the entire leg above heart level. Place pillows under your ankle to keep your knee straight. Compression:  Apply ACE wraps or a thigh-length compression stocking as needed. RANGE OF MOTION:  You are allowed LIMITED RANGE OF MOTION from 0 degrees (full extension) to 90 degrees of flexion    IMMOBILIZATION:  Your knee brace should be WORN AND SET FROM 0-90 DEGREES AT ALL TIMES, including sleep. You may remove the brace only for showering. ACTIVITY:   DO NOT BEAR WEIGHT on the operative leg. Always use crutches. Using Crutches on Stairs:  Going up, lead with your "good" (nonoperative) leg. Going down, lead with your "bad" (operative) leg. Use a hand rail when available. Knee Extension:  Place a rolled towel or pillow under your ankle for 20-30 minutes 3-5 times per day. This will help to maintain full knee extension. Quad Sets:  Sit or lie with your knee straight. Tighten your quadriceps (front thigh) muscle. Hold for 3 seconds, then relax. Repeat 20 times per hour while awake. PHYSICAL THERAPY:  Begin therapy 5 TO 7 DAYS AFTER SURGERY. You were given a prescription for therapy at your preoperative office visit. If you do not have physical therapy scheduled yet, please call our office for assistance. FOLLOW-UP APPOINTMENT:  4-5 days after surgery with:    Dr. Ángel Bryan.  Keisha Valdes, [address] [place] [address] [facility]  [address] [place] [address] , [facility], [address] [place] [address]  [phone] ([address] [place] [address])  [phone] (After-Hours)

## 2023-10-24 NOTE — INTERVAL H&P NOTE
H&P reviewed. After examining the patient I find no changes in the patients condition since the H&P had been written.     Vitals:    10/24/23 0823   BP: 126/80   Pulse: 85   Resp: 16   Temp: 98.1 °F (36.7 °C)   SpO2: 97%

## 2023-10-27 ENCOUNTER — OFFICE VISIT (OUTPATIENT)
Dept: OBGYN CLINIC | Facility: MEDICAL CENTER | Age: 53
End: 2023-10-27

## 2023-10-27 VITALS
SYSTOLIC BLOOD PRESSURE: 138 MMHG | BODY MASS INDEX: 26.84 KG/M2 | HEIGHT: 66 IN | WEIGHT: 167 LBS | DIASTOLIC BLOOD PRESSURE: 79 MMHG | HEART RATE: 87 BPM

## 2023-10-27 DIAGNOSIS — S83.242A ACUTE MEDIAL MENISCUS TEAR OF LEFT KNEE, INITIAL ENCOUNTER: Primary | ICD-10-CM

## 2023-10-27 PROCEDURE — 99024 POSTOP FOLLOW-UP VISIT: CPT | Performed by: ORTHOPAEDIC SURGERY

## 2023-10-27 NOTE — PROGRESS NOTES
Orthopaedic Surgery - Office Note  Bernard Roblero (50 y.o. female)   : 1970   MRN: [id number]  Encounter Date: 10/27/2023    Chief Complaint   Patient presents with    Left Knee - Post-op       Assessment / Plan  Status post left knee medial meniscal root repair 10/24/23    Intra operative pictures reviewed with the patient. NWB LLE for 6 weeks total post operatively. Begin PT for range of motion of the knee, rehab protocol provided  NSAIDs PRN, patient has begun weaning oxycodone  Ice and elevation with compression stockings as needed for swelling  Continue use of hinged knee brace as specified in post op protocol  Return to clinic in 5 weeks for repeat evaluation with Dr. Tammy Montague    History of Present Illness  Bernard Alvarez is a 48 y.o. female who presents POD 4 status post left knee medial meniscus root repair DOS 10/24/23. The patient reports that her pain has been improving since surgery and she has begun to wean the oxycodone. She has been compliant with the brace wear and non weight bearing status. She has had no numbness or tingling of the left lower extremity. She reports she has had some swelling of the left lower extremity, but that it is not painful. She has had no fevers, chills, or drainage from her incisions. Review of Systems  Pertinent items are noted in HPI. All other systems were reviewed and are negative. Physical Exam  /79   Pulse 87   Ht 5' 6" (1.676 m)   Wt 75.8 kg (167 lb)   LMP 2022   BMI 26.95 kg/m²   Cons: Appears well. No apparent distress. Psych: Alert. Oriented x3. Mood and affect normal.  Eyes: PERRLA, EOMI  Resp: Normal effort. No audible wheezing or stridor. CV: Palpable pulse. No discernable arrhythmia. 2+ LE edema. Lymph:  No palpable cervical, axillary, or inguinal lymphadenopathy. Skin: Warm. No palpable masses. No visible lesions. Neuro: Normal muscle tone. Normal and symmetric DTR's.      Left Knee Exam  Alignment:  Normal knee alignment. Inspection:  Incisions clean and dry. Palpation:   anterior knee oren incisional tenderness. ROM:  Not tested. Strength:  Not tested. Stability:  Not tested. Tests:  No pertinent positive or negative tests. Patella:  Patella tracks centrally without crepitus. Neurovascular:  Sensation intact in DP/SP/Mireles/Sa/T nerve distributions. 2+ DP & PT pulses. Gait:  Not tested. Studies Reviewed  No studies to review    Procedures  No procedures today. Medical, Surgical, Family, and Social History  The patient's medical history, family history, and social history, were reviewed and updated as appropriate.     Past Medical History:   Diagnosis Date    Acute medial meniscus tear of left knee     Arthritis     Back pain     Cigarette smoker     Disease of thyroid gland     Hypertension        Past Surgical History:   Procedure Laterality Date    ABDOMINOPLASTY      ARTHROSCOPIC REPAIR ACL Right     AUGMENTATION MAMMAPLASTY Bilateral 2017    saline    CATARACT EXTRACTION       SECTION      1/3/1991    [place] ARTHROSCOPY KNEE W/MENISCUS RPR MEDIAL/LATERAL Left 10/24/2023    Procedure: ARTHROSCOPY KNEE, meniscus root repair;  Surgeon: Mario Salazar MD;  Location: [facility] OR;  Service: [address] [place] [address]      1992, 12/3/1997       Family History   Problem Relation Age of Onset    Diabetes Mother     Heart disease Mother     Hypertension Mother     Cataracts Mother     Thyroid cancer Father     Heart disease Sister     Hypertension Sister     Thyroid disease Sister     Heart disease Sister     Hypertension Sister     Thyroid disease Sister     Heart disease Sister     Hypertension Sister     Thyroid disease Sister     No Known Problems Daughter     No Known Problems Maternal Grandmother     No Known Problems Maternal Grandfather     No Known Problems Paternal Grandmother     No Known Problems Paternal Grandfather     Heart disease Brother     Hypertension Brother     Heart disease Brother     Hypertension Brother     Heart disease Brother     Hypertension Brother     Heart disease Brother     Hypertension Brother     Heart disease Brother     Hypertension Brother     No Known Problems Son     No Known Problems Son     No Known Problems Maternal Aunt     No Known Problems Maternal Aunt     No Known Problems Maternal Aunt     No Known Problems Paternal Aunt     No Known Problems Paternal Aunt        Social History     Occupational History    Not on file   Tobacco Use    Smoking status: Every Day     Packs/day: 0.50     Years: 33.00     Total pack years: 16.50     Types: Cigarettes    Smokeless tobacco: Never    Tobacco comments:     Last cigarette 10/23   Vaping Use    Vaping Use: Never used   Substance and Sexual Activity    Alcohol use: Yes     Comment: rare    Drug use: Never    Sexual activity: Not on file       No Known Allergies      Current Outpatient Medications:     amLODIPine (NORVASC) 5 mg tablet, Take 1 tablet (5 mg total) by mouth daily, Disp: 90 tablet, Rfl: 3    aspirin 81 mg chewable tablet, Chew 1 tablet (81 mg total) 2 (two) times a day for 14 days Take with food. , Disp: 28 tablet, Rfl: 0    cholecalciferol (VITAMIN D3) 1,000 units tablet, Take 1,000 Units by mouth daily, Disp: , Rfl:     COLLAGEN PO, Take by mouth in the morning, Disp: , Rfl:     levothyroxine 100 mcg tablet, Take 1 tablet (100 mcg total) by mouth daily, Disp: 90 tablet, Rfl: 3    meloxicam (Mobic) 15 mg tablet, Take 1 tablet (15 mg total) by mouth daily, Disp: 90 tablet, Rfl: 3    naproxen (NAPROSYN) 500 mg tablet, Take 1 tablet (500 mg total) by mouth 2 (two) times a day with meals, Disp: 20 tablet, Rfl: 0    Omega-3 Fatty Acids (FISH OIL PO), Take by mouth in the morning, Disp: , Rfl:     oxyCODONE (ROXICODONE) 5 immediate release tablet, Take 1 tablet (5 mg total) by mouth every 4 (four) hours as needed for severe pain Max Daily Amount: 30 mg, Disp: 30 tablet, Rfl: 0    varenicline (CHANTIX) 1 mg tablet, Take 1 tablet (1 mg total) by mouth 2 (two) times a day, Disp: 180 tablet, Rfl: 1    vitamin B-12 (CYANOCOBALAMIN) 50 MCG tablet, Take by mouth daily, Disp: , Rfl:     olmesartan (BENICAR) 40 mg tablet, Take 1 tablet (40 mg total) by mouth daily (Patient not taking: Reported on 10/24/2023), Disp: 90 tablet, Rfl: 3    ondansetron (Zofran ODT) 4 mg disintegrating tablet, Take 1 tablet (4 mg total) by mouth every 6 (six) hours as needed for nausea or vomiting (Patient not taking: Reported on 10/27/2023), Disp: 12 tablet, Rfl: 0      Cesia Shay MD    Scribe Attestation      I,:   am acting as a scribe while in the presence of the attending physician.:       I,:   personally performed the services described in this documentation    as scribed in my presence.:

## 2023-12-01 ENCOUNTER — OFFICE VISIT (OUTPATIENT)
Dept: OBGYN CLINIC | Facility: MEDICAL CENTER | Age: 53
End: 2023-12-01

## 2023-12-01 VITALS
BODY MASS INDEX: 26.84 KG/M2 | DIASTOLIC BLOOD PRESSURE: 70 MMHG | SYSTOLIC BLOOD PRESSURE: 110 MMHG | HEART RATE: 94 BPM | WEIGHT: 167 LBS | HEIGHT: 66 IN

## 2023-12-01 DIAGNOSIS — S83.242D ACUTE MEDIAL MENISCUS TEAR OF LEFT KNEE, SUBSEQUENT ENCOUNTER: Primary | ICD-10-CM

## 2023-12-01 PROCEDURE — 99024 POSTOP FOLLOW-UP VISIT: CPT | Performed by: ORTHOPAEDIC SURGERY

## 2023-12-01 NOTE — LETTER
December 1, 2023     Patient: Nyla Dill  YOB: 1970  Date of Visit: 12/1/2023      To Whom it May Concern:    Nyla Dill is under my professional care. Bernard was seen in my office on 12/1/2023. Bernard will remain out of work until her next visit. If you have any questions or concerns, please don't hesitate to call.          Sincerely,          Lesvia Yeboah MD        CC: No Recipients

## 2023-12-01 NOTE — PROGRESS NOTES
Orthopaedic Surgery - Office Note  Bernard Roblero (29 y.o. female)   : 1970   MRN: [id number]  Encounter Date: 2023    Chief Complaint   Patient presents with    Left Knee - Post-op       Assessment / Plan  Status post left knee medial meniscal root repair 10/24/23     Continue PT per protocol  May begin WB at 6 weeks PO,  patient instructed on how to wean back to full weight bearing. May discontinue the brace. Patient will remain out of work  Return in about 4 weeks (around 2023). History of Present Illness  Bernard Fine is a 48 y.o. female who presents 5 1/2 days Status post left knee medial meniscal root repair 10/24/23. Patient has remain non weightbearing. Patient is attending PT as instructed. She is going to [employer]. Review of Systems  Pertinent items are noted in HPI. All other systems were reviewed and are negative. Physical Exam  /70   Pulse 94   Ht 5' 6" (1.676 m)   Wt 75.8 kg (167 lb)   LMP 2022   BMI 26.95 kg/m²   Cons: Appears well. No apparent distress. Psych: Alert. Oriented x3. Mood and affect normal.  Eyes: PERRLA, EOMI  Resp: Normal effort. No audible wheezing or stridor. CV: Palpable pulse. No discernable arrhythmia. No LE edema. Lymph:  No palpable cervical, axillary, or inguinal lymphadenopathy. Skin: Warm. No palpable masses. No visible lesions. Neuro: Normal muscle tone. Normal and symmetric DTR's. Left Knee Exam  Alignment:  Normal knee alignment. Inspection:  Incision clean and dry. Incision healed. Palpation:  No tenderness. ROM:  Knee Extension 3. Knee Flexion 105. Strength:  Not tested. Stability:  Not tested. Tests:  No pertinent positive or negative tests. Patella:  Not tested. Neurovascular:  Sensation intact in DP/SP/Mireles/Sa/T nerve distributions. 2+ DP & PT pulses. Gait:  Steady. Studies Reviewed  No studies to review    Procedures  No procedures today.     Medical, Surgical, Family, and Social History  The patient's medical history, family history, and social history, were reviewed and updated as appropriate.     Past Medical History:   Diagnosis Date    Acute medial meniscus tear of left knee     Arthritis     Back pain     Cigarette smoker     Disease of thyroid gland     Hypertension        Past Surgical History:   Procedure Laterality Date    ABDOMINOPLASTY      ARTHROSCOPIC REPAIR ACL Right     AUGMENTATION MAMMAPLASTY Bilateral 2017    saline    CATARACT EXTRACTION       SECTION      1/3/1991    [place] ARTHROSCOPY KNEE W/MENISCUS RPR MEDIAL/LATERAL Left 10/24/2023    Procedure: ARTHROSCOPY KNEE, meniscus root repair;  Surgeon: Safia Herring MD;  Location: [facility];  Service: Orthopedics    REPEAT  SECTION      1992, 12/3/1997       Family History   Problem Relation Age of Onset    Diabetes Mother     Heart disease Mother     Hypertension Mother     Cataracts Mother     Thyroid cancer Father     Heart disease Sister     Hypertension Sister     Thyroid disease Sister     Heart disease Sister     Hypertension Sister     Thyroid disease Sister     Heart disease Sister     Hypertension Sister     Thyroid disease Sister     No Known Problems Daughter     No Known Problems Maternal Grandmother     No Known Problems Maternal Grandfather     No Known Problems Paternal Grandmother     No Known Problems Paternal Grandfather     Heart disease Brother     Hypertension Brother     Heart disease Brother     Hypertension Brother     Heart disease Brother     Hypertension Brother     Heart disease Brother     Hypertension Brother     Heart disease Brother     Hypertension Brother     No Known Problems Son     No Known Problems Son     No Known Problems Maternal Aunt     No Known Problems Maternal Aunt     No Known Problems Maternal Aunt     No Known Problems Paternal Aunt     No Known Problems Paternal Aunt        Social History     Occupational History    Not on file   Tobacco Use Smoking status: Every Day     Packs/day: 0.50     Years: 33.00     Total pack years: 16.50     Types: Cigarettes    Smokeless tobacco: Never    Tobacco comments:     Last cigarette 10/23   Vaping Use    Vaping Use: Never used   Substance and Sexual Activity    Alcohol use: Yes     Comment: rare    Drug use: Never    Sexual activity: Not on file       No Known Allergies      Current Outpatient Medications:     amLODIPine (NORVASC) 5 mg tablet, Take 1 tablet (5 mg total) by mouth daily, Disp: 90 tablet, Rfl: 3    cholecalciferol (VITAMIN D3) 1,000 units tablet, Take 1,000 Units by mouth daily, Disp: , Rfl:     COLLAGEN PO, Take by mouth in the morning, Disp: , Rfl:     levothyroxine 100 mcg tablet, Take 1 tablet (100 mcg total) by mouth daily, Disp: 90 tablet, Rfl: 3    meloxicam (Mobic) 15 mg tablet, Take 1 tablet (15 mg total) by mouth daily, Disp: 90 tablet, Rfl: 3    naproxen (NAPROSYN) 500 mg tablet, Take 1 tablet (500 mg total) by mouth 2 (two) times a day with meals, Disp: 20 tablet, Rfl: 0    Omega-3 Fatty Acids (FISH OIL PO), Take by mouth in the morning, Disp: , Rfl:     varenicline (CHANTIX) 1 mg tablet, TAKE 1 TABLET BY MOUTH TWICE A DAY, Disp: 168 tablet, Rfl: 3    vitamin B-12 (CYANOCOBALAMIN) 50 MCG tablet, Take by mouth daily, Disp: , Rfl:     aspirin 81 mg chewable tablet, Chew 1 tablet (81 mg total) 2 (two) times a day for 14 days Take with food. , Disp: 28 tablet, Rfl: 0    olmesartan (BENICAR) 40 mg tablet, Take 1 tablet (40 mg total) by mouth daily (Patient not taking: Reported on 10/24/2023), Disp: 90 tablet, Rfl: 3    ondansetron (Zofran ODT) 4 mg disintegrating tablet, Take 1 tablet (4 mg total) by mouth every 6 (six) hours as needed for nausea or vomiting (Patient not taking: Reported on 10/27/2023), Disp: 12 tablet, Rfl: 0    oxyCODONE (ROXICODONE) 5 immediate release tablet, Take 1 tablet (5 mg total) by mouth every 4 (four) hours as needed for severe pain Max Daily Amount: 30 mg (Patient not taking: Reported on 12/1/2023), Disp: 30 tablet, Rfl: 0      [facility]      I,:   am acting as a scribe while in the presence of the attending physician.:       I,:   personally performed the services described in this documentation    as scribed in my presence.:

## 2023-12-14 ENCOUNTER — TELEPHONE (OUTPATIENT)
Age: 53
End: 2023-12-14

## 2023-12-14 NOTE — TELEPHONE ENCOUNTER
Caller: Patient     Doctor: Dr. Koffi García    Reason for call: Patient calling stating that she was hit in the back of  the Left knee by a basket while shopping and she is experiencing some increased pain and swelling in the knee. Patient has been icing and she is looking for advisement as to what she should do.     Call back#: [phone]

## 2023-12-14 NOTE — TELEPHONE ENCOUNTER
Reviewed chart and pt had on 10/24/23 Arthroscopy left knee meniscus root repair. Yesterday 12/13/23 pt was hit in back of left knee w/red grocery basket. Pain was so severe she thought she would faint. Today pain is 7-8/10, has swelling and knee feels like it is going to lock. She is taking OTC Tylenol, elevating and icing. She is using a crutch. Recommended rest, ice, ace wrap (not too tight) for support, elevation, offload w/crutch and for safety, OTC Tylenol/NSAIDs per label instructions and if not contraindicated. Gave pt appt tomorrow at 0915 w/Dr Kennedy Garg to evaluate knee per pt's request to see surgeon. Please review and advise. Thank you.

## 2023-12-15 ENCOUNTER — OFFICE VISIT (OUTPATIENT)
Dept: OBGYN CLINIC | Facility: MEDICAL CENTER | Age: 53
End: 2023-12-15

## 2023-12-15 ENCOUNTER — APPOINTMENT (OUTPATIENT)
Dept: RADIOLOGY | Facility: MEDICAL CENTER | Age: 53
End: 2023-12-15
Payer: COMMERCIAL

## 2023-12-15 VITALS
WEIGHT: 167 LBS | HEART RATE: 87 BPM | HEIGHT: 66 IN | SYSTOLIC BLOOD PRESSURE: 107 MMHG | DIASTOLIC BLOOD PRESSURE: 65 MMHG | BODY MASS INDEX: 26.84 KG/M2

## 2023-12-15 DIAGNOSIS — M25.562 LEFT KNEE PAIN, UNSPECIFIED CHRONICITY: Primary | ICD-10-CM

## 2023-12-15 DIAGNOSIS — M25.562 LEFT KNEE PAIN, UNSPECIFIED CHRONICITY: ICD-10-CM

## 2023-12-15 PROCEDURE — 99024 POSTOP FOLLOW-UP VISIT: CPT | Performed by: ORTHOPAEDIC SURGERY

## 2023-12-15 PROCEDURE — 73564 X-RAY EXAM KNEE 4 OR MORE: CPT

## 2023-12-15 NOTE — PROGRESS NOTES
Orthopaedic Surgery - Office Note  Bernard Roblero (81 y.o. female)   : 1970   MRN: [id number]  Encounter Date: 12/15/2023    Chief Complaint   Patient presents with    Left Knee - Post-op, Follow-up       Assessment / Plan  Status post left knee medial meniscal root repair 10/24/23 with new injury 23. Discuss with the patient that there is not a concern for re-tear from this recent injury  Symptoms should continue to improve   Continue crutch   Continue physical therapy, toe touch WB next 2 weeks   Return for as scheduled. History of Present Illness  Bernard Montero is a 48 y.o. female who presents status post left knee medial meniscal root repair 10/24/23. Patient reports she was hit in the back of the knee 23 by a hand held grocery cart patient notes immediate pain and swelling. Review of Systems  Pertinent items are noted in HPI. All other systems were reviewed and are negative. Physical Exam  /65   Pulse 87   Ht 5' 6" (1.676 m)   Wt 75.8 kg (167 lb)   LMP 2022   BMI 26.95 kg/m²   Cons: Appears well. No apparent distress. Psych: Alert. Oriented x3. Mood and affect normal.  Eyes: PERRLA, EOMI  Resp: Normal effort. No audible wheezing or stridor. CV: Palpable pulse. No discernable arrhythmia. No LE edema. Lymph:  No palpable cervical, axillary, or inguinal lymphadenopathy. Skin: Warm. No palpable masses. No visible lesions. Neuro: Normal muscle tone. Normal and symmetric DTR's. Left Knee Exam  Alignment:  Normal knee alignment. Inspection:  No edema. No erythema. Positive effusion  Palpation:  No tenderness at button lateral joint line, medial joint line, medial hamstring. ROM:  Knee Extension 10. Knee Flexion 120. Strength:  Not tested. Stability:  No objective knee instability. Stable Varus / Valgus stress, Lachman, and Posterior drawer. Tests:  No pertinent positive or negative tests.   Patella:  Patella tracks centrally without crepitus. Neurovascular:  Sensation intact in DP/SP/Mireles/Sa/T nerve distributions. 2+ DP & PT pulses. Gait:  Steady. Studies Reviewed  XR of left knee - no fracture or dislocation, button in place from prior surgery, well maintain joint space. Procedures  No procedures today. Medical, Surgical, Family, and Social History  The patient's medical history, family history, and social history, were reviewed and updated as appropriate.     Past Medical History:   Diagnosis Date    Acute medial meniscus tear of left knee     Arthritis     Back pain     Cigarette smoker     Disease of thyroid gland     Hypertension        Past Surgical History:   Procedure Laterality Date    ABDOMINOPLASTY      ARTHROSCOPIC REPAIR ACL Right     AUGMENTATION MAMMAPLASTY Bilateral 2017    saline    CATARACT EXTRACTION       SECTION      1/3/1991    MT ARTHROSCOPY KNEE W/MENISCUS RPR MEDIAL/LATERAL Left 10/24/2023    Procedure: ARTHROSCOPY KNEE, meniscus root repair;  Surgeon: Paty Pisano MD;  Location: [facility];  Service: Orthopedics    REPEAT  SECTION      1992, 12/3/1997       Family History   Problem Relation Age of Onset    Diabetes Mother     Heart disease Mother     Hypertension Mother     Cataracts Mother     Thyroid cancer Father     Heart disease Sister     Hypertension Sister     Thyroid disease Sister     Heart disease Sister     Hypertension Sister     Thyroid disease Sister     Heart disease Sister     Hypertension Sister     Thyroid disease Sister     No Known Problems Daughter     No Known Problems Maternal Grandmother     No Known Problems Maternal Grandfather     No Known Problems Paternal Grandmother     No Known Problems Paternal Grandfather     Heart disease Brother     Hypertension Brother     Heart disease Brother     Hypertension Brother     Heart disease Brother     Hypertension Brother     Heart disease Brother     Hypertension Brother     Heart disease Brother Hypertension Brother     No Known Problems Son     No Known Problems Son     No Known Problems Maternal Aunt     No Known Problems Maternal Aunt     No Known Problems Maternal Aunt     No Known Problems Paternal Aunt     No Known Problems Paternal Aunt        Social History     Occupational History    Not on file   Tobacco Use    Smoking status: Every Day     Current packs/day: 0.50     Average packs/day: 0.5 packs/day for 33.0 years (16.5 ttl pk-yrs)     Types: Cigarettes    Smokeless tobacco: Never    Tobacco comments:     Last cigarette 10/23   Vaping Use    Vaping status: Never Used   Substance and Sexual Activity    Alcohol use: Yes     Comment: rare    Drug use: Never    Sexual activity: Not on file       No Known Allergies      Current Outpatient Medications:     amLODIPine (NORVASC) 5 mg tablet, Take 1 tablet (5 mg total) by mouth daily, Disp: 90 tablet, Rfl: 3    cholecalciferol (VITAMIN D3) 1,000 units tablet, Take 1,000 Units by mouth daily, Disp: , Rfl:     COLLAGEN PO, Take by mouth in the morning, Disp: , Rfl:     levothyroxine 100 mcg tablet, Take 1 tablet (100 mcg total) by mouth daily, Disp: 90 tablet, Rfl: 3    meloxicam (Mobic) 15 mg tablet, Take 1 tablet (15 mg total) by mouth daily, Disp: 90 tablet, Rfl: 3    naproxen (NAPROSYN) 500 mg tablet, Take 1 tablet (500 mg total) by mouth 2 (two) times a day with meals, Disp: 20 tablet, Rfl: 0    Omega-3 Fatty Acids (FISH OIL PO), Take by mouth in the morning, Disp: , Rfl:     varenicline (CHANTIX) 1 mg tablet, TAKE 1 TABLET BY MOUTH TWICE A DAY, Disp: 168 tablet, Rfl: 3    vitamin B-12 (CYANOCOBALAMIN) 50 MCG tablet, Take by mouth daily, Disp: , Rfl:     aspirin 81 mg chewable tablet, Chew 1 tablet (81 mg total) 2 (two) times a day for 14 days Take with food. , Disp: 28 tablet, Rfl: 0    olmesartan (BENICAR) 40 mg tablet, Take 1 tablet (40 mg total) by mouth daily (Patient not taking: Reported on 10/24/2023), Disp: 90 tablet, Rfl: 3    ondansetron (Zofran ODT) 4 mg disintegrating tablet, Take 1 tablet (4 mg total) by mouth every 6 (six) hours as needed for nausea or vomiting (Patient not taking: Reported on 10/27/2023), Disp: 12 tablet, Rfl: 0    oxyCODONE (ROXICODONE) 5 immediate release tablet, Take 1 tablet (5 mg total) by mouth every 4 (four) hours as needed for severe pain Max Daily Amount: 30 mg (Patient not taking: Reported on 12/1/2023), Disp: 30 tablet, Rfl: 0      [facility]      I,:  [employer] am acting as a scribe while in the presence of the attending physician.:       I,:  Lesvia Yeboah MD personally performed the services described in this documentation    as scribed in my presence.:

## 2023-12-19 ENCOUNTER — OFFICE VISIT (OUTPATIENT)
Dept: OBGYN CLINIC | Facility: HOSPITAL | Age: 53
End: 2023-12-19
Payer: COMMERCIAL

## 2023-12-19 ENCOUNTER — HOSPITAL ENCOUNTER (OUTPATIENT)
Dept: RADIOLOGY | Facility: HOSPITAL | Age: 53
Discharge: HOME | End: 2023-12-19
Attending: ORTHOPAEDIC SURGERY
Payer: COMMERCIAL

## 2023-12-19 VITALS
DIASTOLIC BLOOD PRESSURE: 65 MMHG | HEART RATE: 89 BPM | SYSTOLIC BLOOD PRESSURE: 99 MMHG | HEIGHT: 66 IN | WEIGHT: 167.11 LBS | BODY MASS INDEX: 26.86 KG/M2

## 2023-12-19 DIAGNOSIS — M46.1 SACROILIITIS (HCC): ICD-10-CM

## 2023-12-19 DIAGNOSIS — M54.50 ACUTE LOW BACK PAIN, UNSPECIFIED BACK PAIN LATERALITY, UNSPECIFIED WHETHER SCIATICA PRESENT: ICD-10-CM

## 2023-12-19 DIAGNOSIS — R52 PAIN: ICD-10-CM

## 2023-12-19 DIAGNOSIS — R52 PAIN: Primary | ICD-10-CM

## 2023-12-19 PROCEDURE — 99214 OFFICE O/P EST MOD 30 MIN: CPT | Performed by: ORTHOPAEDIC SURGERY

## 2023-12-19 PROCEDURE — 72110 X-RAY EXAM L-2 SPINE 4/>VWS: CPT

## 2023-12-19 RX ORDER — METHYLPREDNISOLONE 4 MG/1
TABLET ORAL
Qty: 21 TABLET | Refills: 0 | Status: SHIPPED | OUTPATIENT
Start: 2023-12-19

## 2023-12-19 NOTE — PROGRESS NOTES
Assessment & Plan/Medical Decision Makin y.o. female with Back Pain and imaging findings most notable for lumbar spondylosis         The clinical, physical and imaging findings were reviewed with the patient.  Bernard  has a constellation of findings consistent with Lumbar Myofascial Pain and sacroiliac joint dysfunction in the setting of lumbar degenerative disease.      Fortunately patient remains neurologically intact and functional.  Physical exam showing +SI tenderness, +JOYCE.  We discussed the treatment options including physical therapy, at home exercises, activity modifications, chiropractic medicine, oral medications, interventional spine procedures.  At this time recommend continued conservative treatments.    Referral placed for COMPREHENSIVE SPINE PHYSICAL THERAPY to work on core strengthening, lumbar ROM, strengthening, and stretching exercises.    Medrol Dosepak rx sent to patient's pharmacy. Discussed reasoning for prescribing medication, proper usage, and potential side effects.    Patient counseled on the deleterious effects of smoking/tobacco on overall health as well as the musculoskeletal system. Offered smoking cessation program and/or resources for smoking cessation to the patient.  Patient notes she is on Chantix and has cut down recently.     Patient instructed to return to office/ER sooner if symptoms are not improving, getting worse, or new worrisome/neurologic symptoms arise.  Patient will follow up in approx. 3 months for re-evaluation after further conservative treatments.       Subjective:      Chief Complaint: Back Pain    HPI:  Bernard Roblero is a 53 y.o. female presenting for initial visit with chief complaint of back pain.  Pain began several years ago.  Denies any numbness or tingling in her bilateral lower extremities.  She denies any lower extremity weakness.  She did recently have an injury to her left knee requiring surgery with Dr. Mcgill and is still recovering.   Denies any jt trauma. Denies fever or chills, no night sweats. Denies any bladder or bowel changes.      Conservative therapy includes the following:   Medications: Tylenol, IBU    Injections: No     Physical Therapy: No, but currently in PT for her knee postop  Chiropractic Medicine: has not attempted  Accupunture/Massage Therapy: has not attempted   These therapeutic modalities were ineffective at providing sustained pain relief/functional improvement.     Nicotine dependent: 10 cig/day currently on Chantix   Occupation: [employer]   Living situation: Lives with family   ADLs: patient is able to perform     Objective:     Family History   Problem Relation Age of Onset    Diabetes Mother     Heart disease Mother     Hypertension Mother     Cataracts Mother     Thyroid cancer Father     Heart disease Sister     Hypertension Sister     Thyroid disease Sister     Heart disease Sister     Hypertension Sister     Thyroid disease Sister     Heart disease Sister     Hypertension Sister     Thyroid disease Sister     No Known Problems Daughter     No Known Problems Maternal Grandmother     No Known Problems Maternal Grandfather     No Known Problems Paternal Grandmother     No Known Problems Paternal Grandfather     Heart disease Brother     Hypertension Brother     Heart disease Brother     Hypertension Brother     Heart disease Brother     Hypertension Brother     Heart disease Brother     Hypertension Brother     Heart disease Brother     Hypertension Brother     No Known Problems Son     No Known Problems Son     No Known Problems Maternal Aunt     No Known Problems Maternal Aunt     No Known Problems Maternal Aunt     No Known Problems Paternal Aunt     No Known Problems Paternal Aunt        Past Medical History:   Diagnosis Date    Acute medial meniscus tear of left knee     Arthritis     Back pain     Cigarette smoker     Disease of thyroid gland     Hypertension        Current Outpatient  Medications   Medication Sig Dispense Refill    amLODIPine (NORVASC) 5 mg tablet Take 1 tablet (5 mg total) by mouth daily 90 tablet 3    cholecalciferol (VITAMIN D3) 1,000 units tablet Take 1,000 Units by mouth daily      COLLAGEN PO Take by mouth in the morning      levothyroxine 100 mcg tablet Take 1 tablet (100 mcg total) by mouth daily 90 tablet 3    Omega-3 Fatty Acids (FISH OIL PO) Take by mouth in the morning      varenicline (CHANTIX) 1 mg tablet TAKE 1 TABLET BY MOUTH TWICE A  tablet 3    vitamin B-12 (CYANOCOBALAMIN) 50 MCG tablet Take by mouth daily      aspirin 81 mg chewable tablet Chew 1 tablet (81 mg total) 2 (two) times a day for 14 days Take with food. 28 tablet 0    meloxicam (Mobic) 15 mg tablet Take 1 tablet (15 mg total) by mouth daily (Patient not taking: Reported on 2023) 90 tablet 3    naproxen (NAPROSYN) 500 mg tablet Take 1 tablet (500 mg total) by mouth 2 (two) times a day with meals (Patient not taking: Reported on 2023) 20 tablet 0    olmesartan (BENICAR) 40 mg tablet Take 1 tablet (40 mg total) by mouth daily (Patient not taking: Reported on 10/24/2023) 90 tablet 3    ondansetron (Zofran ODT) 4 mg disintegrating tablet Take 1 tablet (4 mg total) by mouth every 6 (six) hours as needed for nausea or vomiting (Patient not taking: Reported on 10/27/2023) 12 tablet 0    oxyCODONE (ROXICODONE) 5 immediate release tablet Take 1 tablet (5 mg total) by mouth every 4 (four) hours as needed for severe pain Max Daily Amount: 30 mg (Patient not taking: Reported on 2023) 30 tablet 0     No current facility-administered medications for this visit.       Past Surgical History:   Procedure Laterality Date    ABDOMINOPLASTY      ARTHROSCOPIC REPAIR ACL Right     AUGMENTATION MAMMAPLASTY Bilateral 2017    saline    CATARACT EXTRACTION       SECTION      1/3/1991    MO ARTHROSCOPY KNEE W/MENISCUS RPR MEDIAL/LATERAL Left 10/24/2023    Procedure: ARTHROSCOPY KNEE, meniscus  "root repair;  Surgeon: Erik Mcgill MD;  Location: [facility];  Service: Orthopedics    REPEAT  SECTION      1992, 12/3/1997       Social History     Socioeconomic History    Marital status:      Spouse name: Not on file    Number of children: Not on file    Years of education: Not on file    Highest education level: Not on file   Occupational History    Not on file   Tobacco Use    Smoking status: Every Day     Current packs/day: 0.50     Average packs/day: 0.5 packs/day for 33.0 years (16.5 ttl pk-yrs)     Types: Cigarettes    Smokeless tobacco: Never    Tobacco comments:     Last cigarette 10/23   Vaping Use    Vaping status: Never Used   Substance and Sexual Activity    Alcohol use: Yes     Comment: rare    Drug use: Never    Sexual activity: Not on file   Other Topics Concern    Not on file   Social History Narrative    ** Merged History Encounter **          Social Determinants of Health     Financial Resource Strain: Not on file   Food Insecurity: Not on file   Transportation Needs: Not on file   Physical Activity: Not on file   Stress: Not on file   Social Connections: Not on file   Intimate Partner Violence: Not on file   Housing Stability: Not on file       No Known Allergies    Review of Systems  General- denies fever/chills  HEENT- denies hearing loss or sore throat  Eyes- denies eye pain or visual disturbances, denies red eyes  Respiratory- denies cough or SOB  Cardio- denies chest pain or palpitations  GI- denies abdominal pain  Endocrine- denies urinary frequency  Urinary- denies pain with urination  Musculoskeletal- Negative except noted above  Skin- denies rashes or wounds  Neurological- denies dizziness or headache  Psychiatric- denies anxiety or difficulty concentrating    Physical Exam  BP 99/65   Pulse 89   Ht 5' 6\" (1.676 m)   Wt 75.8 kg (167 lb 1.7 oz)   LMP 2022   BMI 26.97 kg/m²     General/Constitutional: No apparent distress: well-nourished and well " "developed.  Lymphatic: No appreciable lymphadenopathy  Respiratory: Non-labored breathing  Vascular: No edema, swelling or tenderness, except as noted in detailed exam.  Integumentary: No impressive skin lesions present, except as noted in detailed exam.  Psych: Normal mood and affect, oriented to person, place and time.  MSK: normal other than stated in HPI and exam  Gait & balance: no evidence of myelopathic gait, ambulates Independently     Lumbar spine range of motion:  -Forward flexion to 90  -Extension to neutral  -Lateral bend 25 right, 25 left  -Rotation 25 right, 25 left  There is  no tenderness with palpation along lumbar paraspinal musculature, no midline tenderness     Neurologic:    Lower Extremity Motor Function    Right  Left    Iliopsoas  5/5  5/5    Quadriceps 5/5 5/5   Tibialis anterior  5/5  5/5    EHL  5/5  5/5    Gastroc. muscle  5/5  5/5    Heel rise  5/5  5/5    Toe rise  5/5  5/5      Sensory: light touch is intact to bilateral upper and lower extremities     Reflexes:    Right Left   Patellar 1+ 1+   Achilles 1+ 1+   Babinski neg neg     Other tests:  Straight Leg Raise: negative  Ty SI: positive  JOYCE SI: positive  Greater troch: no tenderness  Internal/external hip ROM: intact, no pain   Flexion/extension knee ROM: intact, no pain   Vascular: WWP extremities, 2+DP bilateral      Diagnostic Tests   IMAGING: I have personally reviewed the images and these are my findings:  Lumbar Spine X-rays from 12/19/23: multi mild level lumbar spondylosis with loss of disc height most noted at L5-S1, no apparent spondylolisthesis, no appreciated lytic/blastic lesions, no obvious instability    Electronic Medical Records were reviewed including [facility] and Dr. Mcgill Notes    Procedures, if performed today     None performed       Portions of the record may have been created with voice recognition software.  Occasional wrong word or \"sound a like\" substitutions may have occurred due to the " inherent limitations of voice recognition software.  Read the chart carefully and recognize, using context, where substitutions have occurred.

## 2024-01-05 ENCOUNTER — TELEPHONE (OUTPATIENT)
Age: 54
End: 2024-01-05

## 2024-01-05 NOTE — TELEPHONE ENCOUNTER
Caller: Patient    Doctor: Artem    Reason for call: Patient called earlier to cancel her appt due to not feeling well. Asking if Dr Mcgill would send an excuse note to her employer? Please fax note to [employer] Attn:Maame fax#[phone]    Call back#: [phone]

## 2024-01-05 NOTE — TELEPHONE ENCOUNTER
Caller: Patient    Doctor: Artem    Reason for call: Patient canceled PO appt 1/5 due to flu-like symptoms and stated she will call back to reschedule when she is feeling better.    Patient stated she is unable to fully weight bear following 10/24 L knee sx and requested an updated work note to remain out of work another 4-6 weeks. Please upload to [employer] and patient will call back with fax number.    Call back#: [phone]

## 2024-01-05 NOTE — TELEPHONE ENCOUNTER
Please let her know that there is a note in the system stating that she should have 2 more weeks off of work due to her recovery but we do need to see her back in 2 weeks before Dr. Mcgill provides her with updated restrictions or further time off

## 2024-01-19 ENCOUNTER — OFFICE VISIT (OUTPATIENT)
Dept: OBGYN CLINIC | Facility: MEDICAL CENTER | Age: 54
End: 2024-01-19

## 2024-01-19 VITALS
HEART RATE: 85 BPM | DIASTOLIC BLOOD PRESSURE: 71 MMHG | SYSTOLIC BLOOD PRESSURE: 117 MMHG | WEIGHT: 167.11 LBS | HEIGHT: 66 IN | BODY MASS INDEX: 26.86 KG/M2

## 2024-01-19 DIAGNOSIS — M25.562 LEFT KNEE PAIN, UNSPECIFIED CHRONICITY: ICD-10-CM

## 2024-01-19 DIAGNOSIS — S83.242D ACUTE MEDIAL MENISCUS TEAR OF LEFT KNEE, SUBSEQUENT ENCOUNTER: Primary | ICD-10-CM

## 2024-01-19 PROCEDURE — 99024 POSTOP FOLLOW-UP VISIT: CPT | Performed by: ORTHOPAEDIC SURGERY
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
## 2024-01-19 NOTE — LETTER
January 19, 2024     Patient: Bernard Roblero  YOB: 1970  Date of Visit: 1/19/2024      To Whom it May Concern:    Bernard Roblero is under my professional care. Bernard was seen in my office on 1/19/2024. Bernard can return to work doing sedentary work only. We will re-evaluate at her next follow up in 6 weeks.    If you have any questions or concerns, please don't hesitate to call.         Sincerely,          Erik Mcgill MD        CC: No Recipients

## 2024-01-22 ENCOUNTER — TELEPHONE (OUTPATIENT)
Age: 54
End: 2024-01-22

## 2024-01-22 NOTE — TELEPHONE ENCOUNTER
Called and spoke w/pt and she states that she saw Dr Mcgill on Friday.  Pt is s/p Left knee medial meniscal root repair.  Activity as tolerated.  Pt states that she cannot bear weight well and is limping.  Advised may want to use cane/crutch to offload.  She denies redness, warmth, fever. Does have swelling and feels like fluid.  Advise may want to wrap in ace wrap not too tight for support. She is awaiting brace.  Pain level is 6/10 that she is managing w/OTC Tylenol and pan med given at time of surgery.   She is also elevating and icing area.  She would like further advice from Dr Mcgill as to what she can do. Please review and advise. Thank you.

## 2024-01-22 NOTE — TELEPHONE ENCOUNTER
Did call the patient to reiterate strategies for swelling management.  She is waiting for a medial  brace from our DME company.  I did discuss with her potentially reevaluating her versus aspirating her knee if it is not improving and she will call us back later this week if she would like to be seen for reevaluation.

## 2024-01-22 NOTE — TELEPHONE ENCOUNTER
Caller: Patient    Doctor: Artem     Reason for call: Patient states her knee has swelling and has fluid, Patient would like to discuss     Call back#: [phone]

## 2024-01-23 NOTE — TELEPHONE ENCOUNTER
Caller: Rodolfo at [facility]    Doctor: Artem    Reason for call:     Rodolfo calling to let the dr office know that there was an increase in pain and swelling with patients left knee, advised the physical therapist the PA did have a discussion with patient and a brace was ordered for the knee.  Acknowledged.    Call back#: n/a

## 2024-01-26 ENCOUNTER — OFFICE VISIT (OUTPATIENT)
Dept: OBGYN CLINIC | Facility: MEDICAL CENTER | Age: 54
End: 2024-01-26
Payer: COMMERCIAL

## 2024-01-26 ENCOUNTER — APPOINTMENT (OUTPATIENT)
Dept: RADIOLOGY | Facility: MEDICAL CENTER | Age: 54
End: 2024-01-26
Payer: COMMERCIAL

## 2024-01-26 VITALS
SYSTOLIC BLOOD PRESSURE: 117 MMHG | DIASTOLIC BLOOD PRESSURE: 77 MMHG | HEIGHT: 66 IN | BODY MASS INDEX: 28.61 KG/M2 | HEART RATE: 83 BPM | WEIGHT: 178 LBS

## 2024-01-26 DIAGNOSIS — M25.462 EFFUSION OF LEFT KNEE: ICD-10-CM

## 2024-01-26 DIAGNOSIS — M25.552 BILATERAL HIP PAIN: ICD-10-CM

## 2024-01-26 DIAGNOSIS — S83.242D ACUTE MEDIAL MENISCUS TEAR OF LEFT KNEE, SUBSEQUENT ENCOUNTER: Primary | ICD-10-CM

## 2024-01-26 DIAGNOSIS — M25.551 BILATERAL HIP PAIN: ICD-10-CM

## 2024-01-26 DIAGNOSIS — M16.0 BILATERAL HIP JOINT ARTHRITIS: ICD-10-CM

## 2024-01-26 PROCEDURE — 99214 OFFICE O/P EST MOD 30 MIN: CPT | Performed by: ORTHOPAEDIC SURGERY

## 2024-01-26 PROCEDURE — 73521 X-RAY EXAM HIPS BI 2 VIEWS: CPT
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
## 2024-02-02 ENCOUNTER — HOSPITAL ENCOUNTER (OUTPATIENT)
Dept: MRI IMAGING | Facility: HOSPITAL | Age: 54
Discharge: HOME | End: 2024-02-02
Attending: ORTHOPAEDIC SURGERY
Payer: COMMERCIAL

## 2024-02-02 DIAGNOSIS — S83.242D ACUTE MEDIAL MENISCUS TEAR OF LEFT KNEE, SUBSEQUENT ENCOUNTER: ICD-10-CM

## 2024-02-02 DIAGNOSIS — M25.462 EFFUSION OF LEFT KNEE: ICD-10-CM

## 2024-02-02 PROCEDURE — 73721 MRI JNT OF LWR EXTRE W/O DYE: CPT

## 2024-02-02 PROCEDURE — G1004 CDSM NDSC: HCPCS

## 2024-02-06 ENCOUNTER — TELEPHONE (OUTPATIENT)
Dept: OBGYN CLINIC | Facility: HOSPITAL | Age: 54
End: 2024-02-06

## 2024-02-06 NOTE — TELEPHONE ENCOUNTER
PT at [facility]     States pt called yesterday stating she will be putting PT on hold for 3 weeks due to work.    Callback: [phone] op 2

## 2024-02-07 ENCOUNTER — TELEPHONE (OUTPATIENT)
Age: 54
End: 2024-02-07

## 2024-02-07 NOTE — TELEPHONE ENCOUNTER
Caller: Patient     Doctor: Artem    Reason for call: Patient wanting to go over her mri results  Please advise      Call back#: [phone]

## 2024-02-09 ENCOUNTER — OFFICE VISIT (OUTPATIENT)
Dept: OBGYN CLINIC | Facility: MEDICAL CENTER | Age: 54
End: 2024-02-09
Payer: COMMERCIAL

## 2024-02-09 VITALS
DIASTOLIC BLOOD PRESSURE: 65 MMHG | BODY MASS INDEX: 27.97 KG/M2 | HEART RATE: 78 BPM | WEIGHT: 174 LBS | HEIGHT: 66 IN | SYSTOLIC BLOOD PRESSURE: 108 MMHG

## 2024-02-09 DIAGNOSIS — S83.242D ACUTE MEDIAL MENISCUS TEAR OF LEFT KNEE, SUBSEQUENT ENCOUNTER: Primary | ICD-10-CM

## 2024-02-09 PROCEDURE — 99214 OFFICE O/P EST MOD 30 MIN: CPT | Performed by: ORTHOPAEDIC SURGERY

## 2024-02-09 NOTE — LETTER
February 9, 2024     Patient: Bernard Roblero  YOB: 1970  Date of Visit: 2/9/2024      To Whom it May Concern:    Bernard Roblero is under my professional care. Bernard was seen in my office on 2/9/2024. Bernard can return to work doing sedentary work. We will re-evaluate at her follow up in March.    If you have any questions or concerns, please don't hesitate to call.         Sincerely,          Erik Mcgill MD        CC: No Recipients
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
## 2024-03-01 ENCOUNTER — OFFICE VISIT (OUTPATIENT)
Dept: OBGYN CLINIC | Facility: MEDICAL CENTER | Age: 54
End: 2024-03-01
Payer: COMMERCIAL

## 2024-03-01 VITALS
BODY MASS INDEX: 27.97 KG/M2 | SYSTOLIC BLOOD PRESSURE: 127 MMHG | DIASTOLIC BLOOD PRESSURE: 83 MMHG | WEIGHT: 174 LBS | HEIGHT: 66 IN | HEART RATE: 75 BPM

## 2024-03-01 DIAGNOSIS — S83.242D ACUTE MEDIAL MENISCUS TEAR OF LEFT KNEE, SUBSEQUENT ENCOUNTER: Primary | ICD-10-CM

## 2024-03-01 PROCEDURE — 99213 OFFICE O/P EST LOW 20 MIN: CPT | Performed by: ORTHOPAEDIC SURGERY

## 2024-03-01 NOTE — PROGRESS NOTES
"Orthopaedic Surgery - Office Note  Bernard Roblero (54 y.o. female)   : 1970   MRN: [id number]  Encounter Date: 3/1/2024    Chief Complaint   Patient presents with    Left Knee - Follow-up     MRI       Assessment / Plan  S/P left knee medial meniscal root repair 10/24/23, with continued pain  Bilateral hip osteoarthritis, mild     We discussed in depth surgical versus non-surgical treatment options to address her medial meniscus tear. We discussed arthroscopy and possible future UKA. She would like to continue non-operative management in the form of physical therapy. She has attempted a medial  brace and she did not like the brace as it caused her too much discomfort.   She was offered and declined a left knee corticosteroid injection today   HEP as tolerated   Ice, heat, rest and NSAIDs as needed   Work note given today which stated she is to remain out of work for two months until next visit   Return in 8 weeks for reevaluation     History of Present Illness  Bernard Roblero is a 54 y.o. female who presents for follow up for continued left knee pain after her medial meniscal root repair in October of last year. She reports the pain is constant, sharp, and varies with severity. The worst pain is 10/10 in nature and when she is resting, the pain is absent. She reports associated clicking and catching. She denies locking of the knee. She reports perceived weakness of the left leg. She would like to continue physical therapy and give her knee time to adjust to the repair.     Review of Systems  Pertinent items are noted in HPI.  All other systems were reviewed and are negative.  A comprehensive review of systems was negative.    Physical Exam  /83   Pulse 75   Ht 5' 6\" (1.676 m)   Wt 78.9 kg (174 lb)   LMP 2022   BMI 28.08 kg/m²   Cons: Appears well.  No apparent distress.  Psych: Alert. Oriented x3.  Mood and affect normal.  Eyes: PERRLA, EOMI  Resp: Normal effort.  No audible wheezing or " stridor.  CV: Palpable pulse.  No discernable arrhythmia.  No LE edema.  Lymph:  No palpable cervical, axillary, or inguinal lymphadenopathy.  Skin: Warm.  No palpable masses.  No visible lesions.  Neuro: Normal muscle tone.  Normal and symmetric DTR's.     Left Knee Exam  Alignment:  Normal knee alignment.  Inspection:   MIld swelling.  Palpation:   Moderate tenderness at medial joint line. positive crepitus. Positive clicking with knee range of motion .  ROM:  Normal knee ROM.  Strength:  Able to SLT without lag   Stability:  No objective knee instability. Stable Varus / Valgus stress, Lachman, and Posterior drawer.  Tests:  (+) Kal.  Patella:  Normal patellar mobility.  Neurovascular:  Sensation intact in Ax/R/M/U nerve distributions.  2+ DP & PT pulses.  Gait:  Antalgic.     Studies Reviewed  No studies to review - no new images were performed for this visit     Procedures  No procedures today.    Medical, Surgical, Family, and Social History  The patient's medical history, family history, and social history, were reviewed and updated as appropriate.    Past Medical History:   Diagnosis Date    Acute medial meniscus tear of left knee     Arthritis     Back pain     Cigarette smoker     Disease of thyroid gland     Hypertension        Past Surgical History:   Procedure Laterality Date    ABDOMINOPLASTY      ARTHROSCOPIC REPAIR ACL Right     AUGMENTATION MAMMAPLASTY Bilateral 2017    saline    CATARACT EXTRACTION       SECTION      1/3/1991    AL ARTHROSCOPY KNEE W/MENISCUS RPR MEDIAL/LATERAL Left 10/24/2023    Procedure: ARTHROSCOPY KNEE, meniscus root repair;  Surgeon: Erik Mcgill MD;  Location: [facility] OR;  Service: Orthopedics    REPEAT  SECTION      1992, 12/3/1997       Family History   Problem Relation Age of Onset    Diabetes Mother     Heart disease Mother     Hypertension Mother     Cataracts Mother     Thyroid cancer Father     Heart disease Sister     Hypertension  Sister     Thyroid disease Sister     Heart disease Sister     Hypertension Sister     Thyroid disease Sister     Heart disease Sister     Hypertension Sister     Thyroid disease Sister     No Known Problems Daughter     No Known Problems Maternal Grandmother     No Known Problems Maternal Grandfather     No Known Problems Paternal Grandmother     No Known Problems Paternal Grandfather     Heart disease Brother     Hypertension Brother     Heart disease Brother     Hypertension Brother     Heart disease Brother     Hypertension Brother     Heart disease Brother     Hypertension Brother     Heart disease Brother     Hypertension Brother     No Known Problems Son     No Known Problems Son     No Known Problems Maternal Aunt     No Known Problems Maternal Aunt     No Known Problems Maternal Aunt     No Known Problems Paternal Aunt     No Known Problems Paternal Aunt        Social History     Occupational History    Not on file   Tobacco Use    Smoking status: Every Day     Current packs/day: 0.50     Average packs/day: 0.5 packs/day for 33.0 years (16.5 ttl pk-yrs)     Types: Cigarettes     Passive exposure: Current    Smokeless tobacco: Never    Tobacco comments:     Last cigarette 10/23   Vaping Use    Vaping status: Never Used   Substance and Sexual Activity    Alcohol use: Yes     Comment: rare    Drug use: Never    Sexual activity: Not on file       No Known Allergies      Current Outpatient Medications:     amLODIPine (NORVASC) 5 mg tablet, Take 1 tablet (5 mg total) by mouth daily, Disp: 90 tablet, Rfl: 3    cholecalciferol (VITAMIN D3) 1,000 units tablet, Take 1,000 Units by mouth daily, Disp: , Rfl:     clotrimazole-betamethasone (LOTRISONE) 1-0.05 % cream, Apply topically 2 (two) times a day, Disp: 60 g, Rfl: 0    COLLAGEN PO, Take by mouth in the morning, Disp: , Rfl:     ibuprofen (MOTRIN) 800 mg tablet, TAKE 1 TABLET (800 MG TOTAL) BY MOUTH EVERY 6 (SIX) HOURS AS NEEDED FOR MILD PAIN, Disp: 60 tablet, Rfl:  0    levothyroxine 100 mcg tablet, Take 1 tablet (100 mcg total) by mouth daily, Disp: 90 tablet, Rfl: 3    phentermine (ADIPEX-P) 37.5 MG tablet, Take 1 tablet (37.5 mg total) by mouth daily, Disp: 90 tablet, Rfl: 0    varenicline (CHANTIX) 1 mg tablet, TAKE 1 TABLET BY MOUTH TWICE A DAY, Disp: 168 tablet, Rfl: 3    vitamin B-12 (CYANOCOBALAMIN) 50 MCG tablet, Take by mouth daily, Disp: , Rfl:     aspirin 81 mg chewable tablet, Chew 1 tablet (81 mg total) 2 (two) times a day for 14 days Take with food., Disp: 28 tablet, Rfl: 0    meloxicam (Mobic) 15 mg tablet, Take 1 tablet (15 mg total) by mouth daily (Patient not taking: Reported on 12/19/2023), Disp: 90 tablet, Rfl: 3    methylPREDNISolone 4 MG tablet therapy pack, Use as directed on package. Do not take other anti-inflammatory (NSAID) medications while on Medrol dose ally. You should not suddenly stop using these medications. Follow the instructions listed on the prescription about tapering your dose. (Patient not taking: Reported on 1/26/2024), Disp: 21 tablet, Rfl: 0    naproxen (NAPROSYN) 500 mg tablet, Take 1 tablet (500 mg total) by mouth 2 (two) times a day with meals (Patient not taking: Reported on 2/9/2024), Disp: 20 tablet, Rfl: 0    olmesartan (BENICAR) 40 mg tablet, Take 1 tablet (40 mg total) by mouth daily (Patient not taking: Reported on 10/24/2023), Disp: 90 tablet, Rfl: 3    Omega-3 Fatty Acids (FISH OIL PO), Take by mouth in the morning (Patient not taking: Reported on 3/1/2024), Disp: , Rfl:     ondansetron (Zofran ODT) 4 mg disintegrating tablet, Take 1 tablet (4 mg total) by mouth every 6 (six) hours as needed for nausea or vomiting (Patient not taking: Reported on 10/27/2023), Disp: 12 tablet, Rfl: 0    oxyCODONE (ROXICODONE) 5 immediate release tablet, Take 1 tablet (5 mg total) by mouth every 4 (four) hours as needed for severe pain Max Daily Amount: 30 mg (Patient not taking: Reported on 12/1/2023), Disp: 30 tablet, Rfl: 0       Jarrell  Keven Hanson MD

## 2024-03-01 NOTE — LETTER
March 1, 2024     Patient: Bernard Roblero  YOB: 1970  Date of Visit: 3/1/2024      To Whom it May Concern:    Bernard Roblero is under my professional care. Bernard was seen in my office on 3/1/2024. Bernard is advised to not return to work until reevaluation in two months     If you have any questions or concerns, please don't hesitate to call.         Sincerely,          Erik Mcgill MD        CC: No Recipients

## 2024-03-04 ENCOUNTER — TELEPHONE (OUTPATIENT)
Dept: OBGYN CLINIC | Facility: HOSPITAL | Age: 54
End: 2024-03-04

## 2024-03-04 DIAGNOSIS — S83.242D ACUTE MEDIAL MENISCUS TEAR OF LEFT KNEE, SUBSEQUENT ENCOUNTER: Primary | ICD-10-CM

## 2024-03-04 NOTE — TELEPHONE ENCOUNTER
Artem Paredes is requesting a script for PT     Will callback with fax number     Callback: [phone]

## 2024-03-05 ENCOUNTER — TELEPHONE (OUTPATIENT)
Age: 54
End: 2024-03-05

## 2024-03-05 NOTE — TELEPHONE ENCOUNTER
Caller: Patient    Doctor: Artem     Reason for call: Patient is calling stating her employer is requesting an updated note stating the dollow up date for her appt which is 5/3/24. Please fax to [phone] attn: Maame    Call back#: [phone]

## 2024-05-01 ENCOUNTER — TELEPHONE (OUTPATIENT)
Age: 54
End: 2024-05-01

## 2024-05-01 NOTE — TELEPHONE ENCOUNTER
Caller: patient    Doctor: christa    Reason for call: patient would like to know if doctor can put in clearance note for patient to go back to work     Call back#: [phone]

## 2024-05-01 NOTE — TELEPHONE ENCOUNTER
Spoke to the patient and put a note that she can return to work full duty without restriction on 5/3/2024.

## 2024-05-01 NOTE — TELEPHONE ENCOUNTER
Caller: Bernard    Doctor: Artem    Reason for call: Patient's work is refusing letter, needs Physician Name and signature on it and needs to return tomorrow 05/2/24. Patient needs this done today.     Fax# [phone] Att: Maame    Call back#: [phone]

## 2024-10-11 ENCOUNTER — HOSPITAL ENCOUNTER (OUTPATIENT)
Dept: MRI IMAGING | Facility: HOSPITAL | Age: 54
Discharge: HOME | End: 2024-10-11

## 2024-10-11 DIAGNOSIS — M25.562 LEFT KNEE PAIN, UNSPECIFIED CHRONICITY: ICD-10-CM

## 2024-10-11 DIAGNOSIS — R93.7 ABNORMAL X-RAY OF LUMBAR SPINE: ICD-10-CM

## 2024-10-11 DIAGNOSIS — M54.9 BACK PAIN, UNSPECIFIED BACK LOCATION, UNSPECIFIED BACK PAIN LATERALITY, UNSPECIFIED CHRONICITY: ICD-10-CM

## 2024-10-14 ENCOUNTER — TELEPHONE (OUTPATIENT)
Age: 54
End: 2024-10-14

## 2024-10-14 NOTE — TELEPHONE ENCOUNTER
Patient calling for the address of her scheduled appointment for today, 10/14/24. Review of appointment desk, future and past, does not show any appointments for today. Patient verbalized understanding.

## 2024-10-15 ENCOUNTER — TELEPHONE (OUTPATIENT)
Dept: GASTROENTEROLOGY | Facility: CLINIC | Age: 54
End: 2024-10-15

## 2024-10-15 ENCOUNTER — PREP FOR PROCEDURE (OUTPATIENT)
Dept: GASTROENTEROLOGY | Facility: CLINIC | Age: 54
End: 2024-10-15

## 2024-10-15 DIAGNOSIS — R19.5 POSITIVE COLORECTAL CANCER SCREENING USING COLOGUARD TEST: Primary | ICD-10-CM

## 2024-10-15 NOTE — TELEPHONE ENCOUNTER
10/15/24  Screened by: Kandy Briseno MA    Referring Provider + cologmo    Pre- Screening:     There is no height or weight on file to calculate BMI.  Has patient been referred for a routine screening Colonoscopy? yes  Is the patient between 45-75 years old? yes      Previous Colonoscopy no   If yes:    Date:     Facility:     Reason:       SCHEDULING STAFF: If the patient is between 45yrs-49yrs, please advise patient to confirm benefits/coverage with their insurance company for a routine screening colonoscopy, some insurance carriers will only cover at 50yrs or older. If the patient is over 75years old, please schedule an office visit.     Does the patient want to see a Gastroenterologist prior to their procedure OR are they having any GI symptoms? no    Has the patient been hospitalized or had abdominal surgery in the past 6 months? no    Does the patient use supplemental oxygen? no    Does the patient take Coumadin, Lovenox, Plavix, Elliquis, Xarelto, or other blood thinning medication? no    Has the patient had a stroke, cardiac event, or stent placed in the past year? no    SCHEDULING STAFF: If patient answers NO to above questions, then schedule procedure. If patient answers YES to above questions, then schedule office appointment.     If patient is between 45yrs - 49yrs, please advise patient that we will have to confirm benefits & coverage with their insurance company for a routine screening colonoscopy.

## 2024-10-15 NOTE — TELEPHONE ENCOUNTER
Pt decided she would like to meet doctor prior to having colonoscopy done. Pt scheduled for 8/30 with Dr. Sutton in [facility].

## 2024-10-25 ENCOUNTER — HOSPITAL ENCOUNTER (EMERGENCY)
Facility: HOSPITAL | Age: 54
Discharge: HOME | End: 2024-10-25
Attending: EMERGENCY MEDICINE
Payer: COMMERCIAL

## 2024-10-25 VITALS
HEART RATE: 81 BPM | TEMPERATURE: 97.4 F | DIASTOLIC BLOOD PRESSURE: 66 MMHG | RESPIRATION RATE: 18 BRPM | SYSTOLIC BLOOD PRESSURE: 124 MMHG | WEIGHT: 184.3 LBS | BODY MASS INDEX: 29.75 KG/M2 | OXYGEN SATURATION: 94 %

## 2024-10-25 DIAGNOSIS — R11.0 NAUSEA: ICD-10-CM

## 2024-10-25 DIAGNOSIS — R42 LIGHTHEADEDNESS: Primary | ICD-10-CM

## 2024-10-25 LAB
ALBUMIN SERPL BCG-MCNC: 4.8 G/DL (ref 3.5–5)
ALP SERPL-CCNC: 64 U/L (ref 34–104)
ALT SERPL W P-5'-P-CCNC: 16 U/L (ref 7–52)
ANION GAP SERPL CALCULATED.3IONS-SCNC: 6 MMOL/L (ref 4–13)
AST SERPL W P-5'-P-CCNC: 17 U/L (ref 13–39)
ATRIAL RATE: 70 BPM
BASOPHILS # BLD AUTO: 0.02 THOUSANDS/ΜL (ref 0–0.1)
BASOPHILS NFR BLD AUTO: 0 % (ref 0–1)
BILIRUB SERPL-MCNC: 0.52 MG/DL (ref 0.2–1)
BUN SERPL-MCNC: 13 MG/DL (ref 5–25)
CALCIUM SERPL-MCNC: 10.1 MG/DL (ref 8.4–10.2)
CHLORIDE SERPL-SCNC: 100 MMOL/L (ref 96–108)
CO2 SERPL-SCNC: 28 MMOL/L (ref 21–32)
CREAT SERPL-MCNC: 0.7 MG/DL (ref 0.6–1.3)
EOSINOPHIL # BLD AUTO: 0.06 THOUSAND/ΜL (ref 0–0.61)
EOSINOPHIL NFR BLD AUTO: 1 % (ref 0–6)
ERYTHROCYTE [DISTWIDTH] IN BLOOD BY AUTOMATED COUNT: 12.2 % (ref 11.6–15.1)
GFR SERPL CREATININE-BSD FRML MDRD: 98 ML/MIN/1.73SQ M
GLUCOSE SERPL-MCNC: 123 MG/DL (ref 65–140)
HCT VFR BLD AUTO: 40.8 % (ref 34.8–46.1)
HGB BLD-MCNC: 13.9 G/DL (ref 11.5–15.4)
IMM GRANULOCYTES # BLD AUTO: 0.04 THOUSAND/UL (ref 0–0.2)
IMM GRANULOCYTES NFR BLD AUTO: 1 % (ref 0–2)
LYMPHOCYTES # BLD AUTO: 1.4 THOUSANDS/ΜL (ref 0.6–4.47)
LYMPHOCYTES NFR BLD AUTO: 18 % (ref 14–44)
MCH RBC QN AUTO: 31.9 PG (ref 26.8–34.3)
MCHC RBC AUTO-ENTMCNC: 34.1 G/DL (ref 31.4–37.4)
MCV RBC AUTO: 94 FL (ref 82–98)
MONOCYTES # BLD AUTO: 0.23 THOUSAND/ΜL (ref 0.17–1.22)
MONOCYTES NFR BLD AUTO: 3 % (ref 4–12)
NEUTROPHILS # BLD AUTO: 6.16 THOUSANDS/ΜL (ref 1.85–7.62)
NEUTS SEG NFR BLD AUTO: 77 % (ref 43–75)
NRBC BLD AUTO-RTO: 0 /100 WBCS
P AXIS: 44 DEGREES
PLATELET # BLD AUTO: 235 THOUSANDS/UL (ref 149–390)
PMV BLD AUTO: 9.5 FL (ref 8.9–12.7)
POTASSIUM SERPL-SCNC: 4 MMOL/L (ref 3.5–5.3)
PR INTERVAL: 170 MS
PROT SERPL-MCNC: 8 G/DL (ref 6.4–8.4)
QRS AXIS: 5 DEGREES
QRSD INTERVAL: 98 MS
QT INTERVAL: 388 MS
QTC INTERVAL: 419 MS
RBC # BLD AUTO: 4.36 MILLION/UL (ref 3.81–5.12)
SODIUM SERPL-SCNC: 134 MMOL/L (ref 135–147)
T WAVE AXIS: 42 DEGREES
VENTRICULAR RATE: 70 BPM
WBC # BLD AUTO: 7.91 THOUSAND/UL (ref 4.31–10.16)

## 2024-10-25 PROCEDURE — 85025 COMPLETE CBC W/AUTO DIFF WBC: CPT

## 2024-10-25 PROCEDURE — 99284 EMERGENCY DEPT VISIT MOD MDM: CPT

## 2024-10-25 PROCEDURE — 96374 THER/PROPH/DIAG INJ IV PUSH: CPT

## 2024-10-25 PROCEDURE — 96361 HYDRATE IV INFUSION ADD-ON: CPT

## 2024-10-25 PROCEDURE — 93005 ELECTROCARDIOGRAM TRACING: CPT

## 2024-10-25 PROCEDURE — 99284 EMERGENCY DEPT VISIT MOD MDM: CPT | Performed by: EMERGENCY MEDICINE

## 2024-10-25 PROCEDURE — 36415 COLL VENOUS BLD VENIPUNCTURE: CPT

## 2024-10-25 PROCEDURE — 80053 COMPREHEN METABOLIC PANEL: CPT

## 2024-10-25 PROCEDURE — 93010 ELECTROCARDIOGRAM REPORT: CPT | Performed by: INTERNAL MEDICINE

## 2024-10-25 RX ORDER — ONDANSETRON 4 MG/1
4 TABLET, ORALLY DISINTEGRATING ORAL ONCE
Status: COMPLETED | OUTPATIENT
Start: 2024-10-25 | End: 2024-10-25

## 2024-10-25 RX ORDER — ONDANSETRON 2 MG/ML
4 INJECTION INTRAMUSCULAR; INTRAVENOUS ONCE
Status: DISCONTINUED | OUTPATIENT
Start: 2024-10-25 | End: 2024-10-25

## 2024-10-25 RX ORDER — ONDANSETRON 2 MG/ML
4 INJECTION INTRAMUSCULAR; INTRAVENOUS ONCE
Status: COMPLETED | OUTPATIENT
Start: 2024-10-25 | End: 2024-10-25

## 2024-10-25 RX ADMIN — ONDANSETRON 4 MG: 2 INJECTION INTRAMUSCULAR; INTRAVENOUS at 11:49

## 2024-10-25 RX ADMIN — SODIUM CHLORIDE 1000 ML: 0.9 INJECTION, SOLUTION INTRAVENOUS at 11:22

## 2024-10-25 RX ADMIN — ONDANSETRON 4 MG: 4 TABLET, ORALLY DISINTEGRATING ORAL at 10:42

## 2024-10-25 NOTE — ED PROVIDER NOTES
ED Disposition       ED Disposition   Discharge    Condition   Stable    Date/Time   Fri Oct 25, 2024 12:12 PM    Comment   Bernard Roblero discharge to home/self care.                   Assessment & Plan   {Hyperlinks  Risk Stratification - NIHSS - HEART SCORE - Fill out sepsis note and make sure you call [id number] if severe or septic shock:[id number]}    Medical Decision Making  Bernard Roblero is a(n) 54 y.o. female with PMH significant for HTN, hypothyroidism, presnted to the ED due to sudden onset lightheadedness, weakness. Patient felt presyncopal with SOB and palpitations. No LOC or fall. Patient is opioid naive and took 2 Oxycodone at 1AM and 2 oxycodone at 4AM. Likely cause of symptoms is Oxycodone use. CMP and CBC wnl. Patient's symptoms improved after Zofran 4mg x2. Patient able to tolerate PO.     Amount and/or Complexity of Data Reviewed  Labs: ordered.     Details: CMP, CBC    Risk  Prescription drug management.             Medications   sodium chloride 0.9 % bolus 1,000 mL (1,000 mL Intravenous New Bag 10/25/24 1122)   ondansetron (ZOFRAN-ODT) dispersible tablet 4 mg (4 mg Oral Given 10/25/24 1042)   ondansetron (ZOFRAN) injection 4 mg (4 mg Intravenous Given 10/25/24 1149)       ED Risk Strat Scores                                               History of Present Illness   {Hyperlinks  History (Med, Surg, Fam, Social) - Current Medications - Allergies  :[id number]}    Chief Complaint   Patient presents with    Dizziness     Patient arrived via ems. dizziness, nausea and vomiting beginning at 0500. Patient took oxycodone at 0100 and 0400. Patient had ingrown toenail procedure on right 1st toe yesterday.        Past Medical History:   Diagnosis Date    Acute medial meniscus tear of left knee     Arthritis     Back pain     Cigarette smoker     Disease of thyroid gland     Hypertension       Past Surgical History:   Procedure Laterality Date    ABDOMINOPLASTY      ARTHROSCOPIC REPAIR ACL Right     AUGMENTATION  MAMMAPLASTY Bilateral 2017    saline    CATARACT EXTRACTION       SECTION      1/3/1991    ID ARTHROSCOPY KNEE W/MENISCUS RPR MEDIAL/LATERAL Left 10/24/2023    Procedure: ARTHROSCOPY KNEE, meniscus root repair;  Surgeon: Erik Mcgill MD;  Location: [facility];  Service: Orthopedics    REPEAT  SECTION      1992, 12/3/1997      Family History   Problem Relation Age of Onset    Diabetes Mother     Heart disease Mother     Hypertension Mother     Cataracts Mother     Thyroid cancer Father     Heart disease Sister     Hypertension Sister     Thyroid disease Sister     Heart disease Sister     Hypertension Sister     Thyroid disease Sister     Heart disease Sister     Hypertension Sister     Thyroid disease Sister     No Known Problems Daughter     No Known Problems Maternal Grandmother     No Known Problems Maternal Grandfather     No Known Problems Paternal Grandmother     No Known Problems Paternal Grandfather     Heart disease Brother     Hypertension Brother     Heart disease Brother     Hypertension Brother     Heart disease Brother     Hypertension Brother     Heart disease Brother     Hypertension Brother     Heart disease Brother     Hypertension Brother     No Known Problems Son     No Known Problems Son     No Known Problems Maternal Aunt     No Known Problems Maternal Aunt     No Known Problems Maternal Aunt     No Known Problems Paternal Aunt     No Known Problems Paternal Aunt       Social History     Tobacco Use    Smoking status: Every Day     Current packs/day: 0.50     Average packs/day: 0.5 packs/day for 33.0 years (16.5 ttl pk-yrs)     Types: Cigarettes     Passive exposure: Current    Smokeless tobacco: Never    Tobacco comments:     Last cigarette 10/23   Vaping Use    Vaping status: Never Used   Substance Use Topics    Alcohol use: Yes     Comment: rare    Drug use: Never      E-Cigarette/Vaping    E-Cigarette Use Never User       E-Cigarette/Vaping Substances     Nicotine No     THC No     CBD No     Flavoring No     Other No     Unknown No       I have reviewed and agree with the history as documented.     Bernard Roblero is a(n) 54 y.o. female with PMH significant for HTN, hypothyroidism, presnted to the ED due to lightheadedness and weakness that began suddenly this morning. Patient felt presyncopal with SOB and palpitations. Patient denies LOC and fall. She had a similar episode in the last year. Feels dizzy, nauseous, and vomited on the way to ED. Patient has taken 2 Oxycodone at 1AM and again at 4AM. Patient denies headache. Patient takes blood pressure medication and is compliant.       Dizziness  Associated symptoms: weakness    Associated symptoms: no tinnitus        Review of Systems   HENT:  Positive for congestion. Negative for ear discharge, ear pain and tinnitus.    Neurological:  Positive for dizziness, weakness and light-headedness. Negative for syncope and facial asymmetry.           Objective   {Hyperlinks  Historical Vitals - Historical Labs - Chart Review/Microbiology - Last Echo - Code Status  :[id number]}    ED Triage Vitals [10/25/24 0954]   Temperature Pulse Blood Pressure Respirations SpO2 Patient Position - Orthostatic VS   (!) 97.4 °F (36.3 °C) 81 124/66 18 94 % Lying      Temp Source Heart Rate Source BP Location FiO2 (%) Pain Score    Oral Monitor Right arm -- No Pain      Vitals      Date and Time Temp Pulse SpO2 Resp BP Pain Score FACES Pain Rating User   10/25/24 0954 97.4 °F (36.3 °C) 81 94 % 18 124/66 No Pain -- KG            Physical Exam  Vitals and nursing note reviewed.   Constitutional:       General: She is in acute distress.      Appearance: She is well-developed.   HENT:      Head: Normocephalic and atraumatic.   Eyes:      Extraocular Movements: Extraocular movements intact.      Conjunctiva/sclera: Conjunctivae normal.   Cardiovascular:      Rate and Rhythm: Normal rate and regular rhythm.      Heart sounds: Normal heart sounds. No  murmur heard.  Pulmonary:      Effort: Pulmonary effort is normal. No respiratory distress.      Breath sounds: Normal breath sounds.   Abdominal:      General: Abdomen is flat. Bowel sounds are normal.      Palpations: Abdomen is soft.      Tenderness: There is no abdominal tenderness.   Musculoskeletal:         General: No swelling.   Skin:     General: Skin is warm and dry.   Neurological:      Mental Status: She is alert.   Psychiatric:         Mood and Affect: Mood normal.         Results Reviewed       Procedure Component Value Units Date/Time    Comprehensive metabolic panel [[id number]]  (Abnormal) Collected: 10/25/24 1115    Lab Status: Final result Specimen: Blood from Arm, Left Updated: 10/25/24 1139     Sodium 134 mmol/L      Potassium 4.0 mmol/L      Chloride 100 mmol/L      CO2 28 mmol/L      ANION GAP 6 mmol/L      BUN 13 mg/dL      Creatinine 0.70 mg/dL      Glucose 123 mg/dL      Calcium 10.1 mg/dL      AST 17 U/L      ALT 16 U/L      Alkaline Phosphatase 64 U/L      Total Protein 8.0 g/dL      Albumin 4.8 g/dL      Total Bilirubin 0.52 mg/dL      eGFR 98 ml/min/1.73sq m     Narrative:      National Kidney Disease Foundation guidelines for Chronic Kidney Disease (CKD):     Stage 1 with normal or high GFR (GFR > 90 mL/min/1.73 square meters)    Stage 2 Mild CKD (GFR = 60-89 mL/min/1.73 square meters)    Stage 3A Moderate CKD (GFR = 45-59 mL/min/1.73 square meters)    Stage 3B Moderate CKD (GFR = 30-44 mL/min/1.73 square meters)    Stage 4 Severe CKD (GFR = 15-29 mL/min/1.73 square meters)    Stage 5 End Stage CKD (GFR <15 mL/min/1.73 square meters)  Note: GFR calculation is accurate only with a steady state creatinine    CBC and differential [[id number]]  (Abnormal) Collected: 10/25/24 1115    Lab Status: Final result Specimen: Blood from Arm, Left Updated: 10/25/24 1121     WBC 7.91 Thousand/uL      RBC 4.36 Million/uL      Hemoglobin 13.9 g/dL      Hematocrit 40.8 %      MCV 94 fL      MCH 31.9 pg       MCHC 34.1 g/dL      RDW 12.2 %      MPV 9.5 fL      Platelets 235 Thousands/uL      nRBC 0 /100 WBCs      Segmented % 77 %      Immature Grans % 1 %      Lymphocytes % 18 %      Monocytes % 3 %      Eosinophils Relative 1 %      Basophils Relative 0 %      Absolute Neutrophils 6.16 Thousands/µL      Absolute Immature Grans 0.04 Thousand/uL      Absolute Lymphocytes 1.40 Thousands/µL      Absolute Monocytes 0.23 Thousand/µL      Eosinophils Absolute 0.06 Thousand/µL      Basophils Absolute 0.02 Thousands/µL             No orders to display       Procedures    ED Medication and Procedure Management   Prior to Admission Medications   Prescriptions Last Dose Informant Patient Reported? Taking?   COLLAGEN PO  Self Yes No   Sig: Take by mouth in the morning   Omega-3 Fatty Acids (FISH OIL PO)  Self Yes No   Sig: Take by mouth in the morning   Patient not taking: Reported on 3/1/2024   amLODIPine (NORVASC) 5 mg tablet   No No   Sig: Take 1 tablet (5 mg total) by mouth daily   aspirin 81 mg chewable tablet   No No   Sig: Chew 1 tablet (81 mg total) 2 (two) times a day for 14 days Take with food.   benzonatate (TESSALON PERLES) 100 mg capsule   No No   Sig: Take 1 capsule (100 mg total) by mouth 3 (three) times a day as needed for cough   cholecalciferol (VITAMIN D3) 1,000 units tablet   Yes No   Sig: Take 1,000 Units by mouth daily   clotrimazole-betamethasone (LOTRISONE) 1-0.05 % cream   No No   Sig: Apply topically 2 (two) times a day   ibuprofen (MOTRIN) 800 mg tablet   No No   Sig: Take 1 tablet (800 mg total) by mouth every 6 (six) hours as needed for mild pain   levothyroxine 100 mcg tablet   No No   Sig: Take 1 tablet (100 mcg total) by mouth daily   meloxicam (Mobic) 15 mg tablet  Self No No   Sig: Take 1 tablet (15 mg total) by mouth daily   Patient not taking: Reported on 12/19/2023   methylPREDNISolone 4 MG tablet therapy pack   No No   Sig: Use as directed on package. Do not take other anti-inflammatory  (NSAID) medications while on Medrol dose ally. You should not suddenly stop using these medications. Follow the instructions listed on the prescription about tapering your dose.   Patient not taking: Reported on 1/26/2024   naproxen (NAPROSYN) 500 mg tablet   No No   Sig: Take 1 tablet (500 mg total) by mouth 2 (two) times a day with meals   Patient not taking: Reported on 2/9/2024   olmesartan (BENICAR) 40 mg tablet   No No   Sig: Take 1 tablet (40 mg total) by mouth daily   ondansetron (Zofran ODT) 4 mg disintegrating tablet   No No   Sig: Take 1 tablet (4 mg total) by mouth every 6 (six) hours as needed for nausea or vomiting   Patient not taking: Reported on 10/27/2023   oxyCODONE (ROXICODONE) 5 immediate release tablet   No No   Sig: Take 1 tablet (5 mg total) by mouth every 4 (four) hours as needed for severe pain Max Daily Amount: 30 mg   phentermine (ADIPEX-P) 37.5 MG tablet   No No   Sig: Take 1 tablet (37.5 mg total) by mouth daily   rosuvastatin (CRESTOR) 10 MG tablet   No No   Sig: Take 1 tablet (10 mg total) by mouth daily   varenicline (CHANTIX) 1 mg tablet   No No   Sig: TAKE 1 TABLET BY MOUTH TWICE A DAY   vitamin B-12 (CYANOCOBALAMIN) 50 MCG tablet   Yes No   Sig: Take by mouth daily      Facility-Administered Medications: None     Patient's Medications   Discharge Prescriptions    No medications on file     No discharge procedures on file.  ED SEPSIS DOCUMENTATION

## 2024-10-25 NOTE — ED ATTENDING ATTESTATION
10/25/2024  I, Jerrica Florentino DO, saw and evaluated the patient. I have discussed the patient with the resident/non-physician practitioner and agree with the resident's/non-physician practitioner's findings, Plan of Care, and MDM as documented in the resident's/non-physician practitioner's note, except where noted. All available labs and Radiology studies were reviewed.  I was present for key portions of any procedure(s) performed by the resident/non-physician practitioner and I was immediately available to provide assistance.       At this point I agree with the current assessment done in the Emergency Department.  I have conducted an independent evaluation of this patient a history and physical is as follows:54y F here for evaluation of LH/weakness, assoc w/ nausea and one episode of nbnb emesis.  Pt has podiatry procedure yesterday and is taking oxycodone prn for pain and took two tabs at 0100 and at 0400 (total of 20 mg) - pt not on chronic opioid therapy.  Pt reports having influenza last week - all symptoms have resolved.  WNWD nad, mmm, neck supple, resp non-labored, cv regular rate, abd nd, ext no cce, skin dry, neuro non-focal, normal mood.  A/P LH, nausea - likely related to large doses of oxycodone.    Will get EKG to r/o arrhythmia.  Will get labs to r/o acute life threatening metabolic abnl, significant anemia.  Will give anti-emetics, fluids and r-eval        ED Course     Labs Reviewed   CBC AND DIFFERENTIAL - Abnormal       Result Value Ref Range Status    WBC 7.91  4.31 - 10.16 Thousand/uL Final    RBC 4.36  3.81 - 5.12 Million/uL Final    Hemoglobin 13.9  11.5 - 15.4 g/dL Final    Hematocrit 40.8  34.8 - 46.1 % Final    MCV 94  82 - 98 fL Final    MCH 31.9  26.8 - 34.3 pg Final    MCHC 34.1  31.4 - 37.4 g/dL Final    RDW 12.2  11.6 - 15.1 % Final    MPV 9.5  8.9 - 12.7 fL Final    Platelets 235  149 - 390 Thousands/uL Final    nRBC 0  /100 WBCs Final    Segmented % 77 (*) 43 - 75 % Final     Immature Grans % 1  0 - 2 % Final    Lymphocytes % 18  14 - 44 % Final    Monocytes % 3 (*) 4 - 12 % Final    Eosinophils Relative 1  0 - 6 % Final    Basophils Relative 0  0 - 1 % Final    Absolute Neutrophils 6.16  1.85 - 7.62 Thousands/µL Final    Absolute Immature Grans 0.04  0.00 - 0.20 Thousand/uL Final    Absolute Lymphocytes 1.40  0.60 - 4.47 Thousands/µL Final    Absolute Monocytes 0.23  0.17 - 1.22 Thousand/µL Final    Eosinophils Absolute 0.06  0.00 - 0.61 Thousand/µL Final    Basophils Absolute 0.02  0.00 - 0.10 Thousands/µL Final   COMPREHENSIVE METABOLIC PANEL - Abnormal    Sodium 134 (*) 135 - 147 mmol/L Final    Potassium 4.0  3.5 - 5.3 mmol/L Final    Chloride 100  96 - 108 mmol/L Final    CO2 28  21 - 32 mmol/L Final    ANION GAP 6  4 - 13 mmol/L Final    BUN 13  5 - 25 mg/dL Final    Creatinine 0.70  0.60 - 1.30 mg/dL Final    Comment: Standardized to IDMS reference method    Glucose 123  65 - 140 mg/dL Final    Comment: If the patient is fasting, the ADA then defines impaired fasting glucose as > 100 mg/dL and diabetes as > or equal to 123 mg/dL.    Calcium 10.1  8.4 - 10.2 mg/dL Final    AST 17  13 - 39 U/L Final    ALT 16  7 - 52 U/L Final    Comment: Specimen collection should occur prior to Sulfasalazine administration due to the potential for falsely depressed results.     Alkaline Phosphatase 64  34 - 104 U/L Final    Total Protein 8.0  6.4 - 8.4 g/dL Final    Albumin 4.8  3.5 - 5.0 g/dL Final    Total Bilirubin 0.52  0.20 - 1.00 mg/dL Final    Comment: Use of this assay is not recommended for patients undergoing treatment with eltrombopag due to the potential for falsely elevated results.  N-acetyl-p-benzoquinone imine (metabolite of Acetaminophen) will generate erroneously low results in samples for patients that have taken an overdose of Acetaminophen.    eGFR 98  ml/min/1.73sq m Final    Narrative:     National Kidney Disease Foundation guidelines for Chronic Kidney Disease (CKD):      Stage 1 with normal or high GFR (GFR > 90 mL/min/1.73 square meters)    Stage 2 Mild CKD (GFR = 60-89 mL/min/1.73 square meters)    Stage 3A Moderate CKD (GFR = 45-59 mL/min/1.73 square meters)    Stage 3B Moderate CKD (GFR = 30-44 mL/min/1.73 square meters)    Stage 4 Severe CKD (GFR = 15-29 mL/min/1.73 square meters)    Stage 5 End Stage CKD (GFR <15 mL/min/1.73 square meters)  Note: GFR calculation is accurate only with a steady state creatinine     No orders to display         Critical Care Time  Procedures  1. Lightheadedness        2. Nausea          Time reflects when diagnosis was documented in both MDM as applicable and the Disposition within this note       Time User Action Codes Description Comment    10/25/2024 12:50 PM Jerrica Florentino Add [R42] Lightheadedness     10/25/2024 12:50 PM Jerrica Florentino Add [R11.0] Nausea           ED Disposition       ED Disposition   Discharge    Condition   Stable    Date/Time   Fri Oct 25, 2024 12:12 PM    Comment   Bernard Roblero discharge to home/self care.                   Follow-up Information       Follow up With Specialties Details Why Contact Info    Guerita Rivero MD Family Medicine Schedule an appointment as soon as possible for a visit  As needed [address] [place] [id number] [phone]

## 2024-10-25 NOTE — DISCHARGE INSTRUCTIONS
You can alternate acetaminophen 1000mg and ibuprofen 600mg every 3 hours for pain.  Try to time your ibuprofen so you can take it when you eat.  Additionally you can apply ice for 15-20 minutes every 1-2 hours while awake for additional pain control.      If you are still having significant pain despite the above measures, you can take oxycodone 5mg every 4-6 hours for breakthrough/severe pain.  No driving / operating machinery while taking this medication . It will cause constipation, so be sure to start a stool softener and a laxative immediately.

## 2024-10-28 NOTE — PROGRESS NOTES
Ambulatory Visit  Name: Benrard Roblero      : 1970      MRN: [id number]  Encounter Provider: Anju Sutton MD  Encounter Date: 10/30/2024   Encounter department: [facility] COLON AND RECTAL SURGERY [facility]ON[facility]    Assessment & Plan  Positive colorectal cancer screening using Cologuard test  Discussed the risk, benefits, and alternatives of colonoscopy with the patient, and she is agreeable to proceed  We discussed preoperative bowel prep and have plan for colonoscopy in the next few weeks  We also discussed follow-up should polyps or cancers to be diagnosed on colonoscopy  We will reconvene pending colonoscopy results         History of Present Illness     Bernard Roblero is a 54 y.o. female who presents for colonoscopy consult.     The patient states she is doing well; denies any abdominal pain, rectal bleeding, nausea/emesis or unexpected weight loss. Notes an average of 0-1 soft and formed bowel movement per day with occasional episodes constipation.     Positive Cologuard on 2024.     The patient notes no prior colonoscopy and denies family history of colorectal cancer or polyps.     History obtained from : patient  Review of Systems   Constitutional:  Negative for chills and fever.   HENT:  Negative for ear pain and sore throat.    Eyes:  Negative for pain and visual disturbance.   Respiratory:  Negative for cough and shortness of breath.    Cardiovascular:  Negative for chest pain and palpitations.   Gastrointestinal:  Negative for abdominal pain and vomiting.   Genitourinary:  Negative for dysuria and hematuria.   Musculoskeletal:  Negative for arthralgias and back pain.   Skin:  Negative for color change and rash.   Neurological:  Negative for seizures and syncope.   All other systems reviewed and are negative.    Past Medical History   Past Medical History:   Diagnosis Date    Acute medial meniscus tear of left knee     Arthritis     Back pain     Cigarette smoker     Disease of thyroid  gland     Hypertension      Past Surgical History:   Procedure Laterality Date    ABDOMINOPLASTY      ARTHROSCOPIC REPAIR ACL Right     AUGMENTATION MAMMAPLASTY Bilateral 2017    saline    CATARACT EXTRACTION       SECTION      1/3/1991    [place] ARTHROSCOPY KNEE W/MENISCUS RPR MEDIAL/LATERAL Left 10/24/2023    Procedure: ARTHROSCOPY KNEE, meniscus root repair;  Surgeon: Erik Mcgill MD;  Location: [facility];  Service: Orthopedics    REPEAT  SECTION      1992, 12/3/1997     Family History   Problem Relation Age of Onset    Diabetes Mother     Heart disease Mother     Hypertension Mother     Cataracts Mother     Thyroid cancer Father     Heart disease Sister     Hypertension Sister     Thyroid disease Sister     Heart disease Sister     Hypertension Sister     Thyroid disease Sister     Heart disease Sister     Hypertension Sister     Thyroid disease Sister     No Known Problems Daughter     No Known Problems Maternal Grandmother     No Known Problems Maternal Grandfather     No Known Problems Paternal Grandmother     No Known Problems Paternal Grandfather     Heart disease Brother     Hypertension Brother     Heart disease Brother     Hypertension Brother     Heart disease Brother     Hypertension Brother     Heart disease Brother     Hypertension Brother     Heart disease Brother     Hypertension Brother     No Known Problems Son     No Known Problems Son     No Known Problems Maternal Aunt     No Known Problems Maternal Aunt     No Known Problems Maternal Aunt     No Known Problems Paternal Aunt     No Known Problems Paternal Aunt      Current Outpatient Medications on File Prior to Visit   Medication Sig Dispense Refill    amLODIPine (NORVASC) 5 mg tablet Take 1 tablet (5 mg total) by mouth daily 120 tablet 0    benzonatate (TESSALON PERLES) 100 mg capsule Take 1 capsule (100 mg total) by mouth 3 (three) times a day as needed for cough 20 capsule 0    cholecalciferol (VITAMIN D3) 1,000  units tablet Take 1,000 Units by mouth daily      clotrimazole-betamethasone (LOTRISONE) 1-0.05 % cream Apply topically 2 (two) times a day 60 g 0    COLLAGEN PO Take by mouth in the morning      ibuprofen (MOTRIN) 800 mg tablet Take 1 tablet (800 mg total) by mouth every 6 (six) hours as needed for mild pain 60 tablet 0    levothyroxine 100 mcg tablet Take 1 tablet (100 mcg total) by mouth daily 120 tablet 0    olmesartan (BENICAR) 40 mg tablet Take 1 tablet (40 mg total) by mouth daily 90 tablet 3    phentermine (ADIPEX-P) 37.5 MG tablet Take 1 tablet (37.5 mg total) by mouth daily 90 tablet 0    rosuvastatin (CRESTOR) 10 MG tablet Take 1 tablet (10 mg total) by mouth daily 100 tablet 3    varenicline (CHANTIX) 1 mg tablet TAKE 1 TABLET BY MOUTH TWICE A  tablet 3    vitamin B-12 (CYANOCOBALAMIN) 50 MCG tablet Take by mouth daily      aspirin 81 mg chewable tablet Chew 1 tablet (81 mg total) 2 (two) times a day for 14 days Take with food. 28 tablet 0    meloxicam (Mobic) 15 mg tablet Take 1 tablet (15 mg total) by mouth daily (Patient not taking: Reported on 12/19/2023) 90 tablet 3    methylPREDNISolone 4 MG tablet therapy pack Use as directed on package. Do not take other anti-inflammatory (NSAID) medications while on Medrol dose ally. You should not suddenly stop using these medications. Follow the instructions listed on the prescription about tapering your dose. (Patient not taking: Reported on 1/26/2024) 21 tablet 0    naproxen (NAPROSYN) 500 mg tablet Take 1 tablet (500 mg total) by mouth 2 (two) times a day with meals (Patient not taking: Reported on 2/9/2024) 20 tablet 0    Omega-3 Fatty Acids (FISH OIL PO) Take by mouth in the morning (Patient not taking: Reported on 3/1/2024)      ondansetron (Zofran ODT) 4 mg disintegrating tablet Take 1 tablet (4 mg total) by mouth every 6 (six) hours as needed for nausea or vomiting (Patient not taking: Reported on 10/27/2023) 12 tablet 0    oxyCODONE (ROXICODONE)  5 immediate release tablet Take 1 tablet (5 mg total) by mouth every 4 (four) hours as needed for severe pain Max Daily Amount: 30 mg (Patient not taking: Reported on 10/30/2024) 30 tablet 0     No current facility-administered medications on file prior to visit.   No Known Allergies       Objective     LMP 08/09/2022     Physical Exam  Vitals and nursing note reviewed.   Constitutional:       General: She is not in acute distress.     Appearance: She is well-developed.   HENT:      Head: Normocephalic and atraumatic.   Eyes:      Conjunctiva/sclera: Conjunctivae normal.   Cardiovascular:      Rate and Rhythm: Normal rate and regular rhythm.      Heart sounds: No murmur heard.  Pulmonary:      Effort: Pulmonary effort is normal. No respiratory distress.      Breath sounds: Normal breath sounds.   Abdominal:      Palpations: Abdomen is soft.      Tenderness: There is no abdominal tenderness.   Musculoskeletal:         General: No swelling.      Cervical back: Neck supple.   Skin:     General: Skin is warm and dry.      Capillary Refill: Capillary refill takes less than 2 seconds.   Neurological:      Mental Status: She is alert.   Psychiatric:         Mood and Affect: Mood normal.       Administrative Statements   I have spent a total time of 30 minutes in caring for this patient on the day of the visit/encounter including Diagnostic results, Prognosis, Risks and benefits of tx options, Instructions for management, Patient and family education, Importance of tx compliance, Risk factor reductions, Impressions, Counseling / Coordination of care, Documenting in the medical record, Reviewing / ordering tests, medicine, procedures  , Obtaining or reviewing history  , and Communicating with other healthcare professionals .

## 2024-10-30 ENCOUNTER — OFFICE VISIT (OUTPATIENT)
Age: 54
End: 2024-10-30
Payer: COMMERCIAL

## 2024-10-30 ENCOUNTER — TELEPHONE (OUTPATIENT)
Age: 54
End: 2024-10-30

## 2024-10-30 ENCOUNTER — PREP FOR PROCEDURE (OUTPATIENT)
Age: 54
End: 2024-10-30

## 2024-10-30 VITALS — HEIGHT: 66 IN | WEIGHT: 170 LBS | BODY MASS INDEX: 27.32 KG/M2

## 2024-10-30 DIAGNOSIS — R19.5 POSITIVE COLORECTAL CANCER SCREENING USING COLOGUARD TEST: Primary | ICD-10-CM

## 2024-10-30 PROCEDURE — 99203 OFFICE O/P NEW LOW 30 MIN: CPT | Performed by: SURGERY

## 2024-10-30 NOTE — LETTER
Sohir Dahdah  [address] [facility] [address]  [place] [id number]            Location:  [facility] - [address] [place] [address], [place] [id number]    Performing Physician: Anju Sutton MD      DATE OF PROCEDURE: 11/18/2024       The OR/GI Lab will contact you the evening prior to your procedure with your exact arrival time.    We kindly ask that you immediately notify us of any need to cancel or reschedule your procedure.      WEEK BEFORE THE PROCEDURE:  Do not take Iron tablets for one week  5 days prior to the appointment AVOID vegetables and fruits with skins or seeds, nuts, corn, popcorn, and whole grain breads.  Purchase: One (1) 238-gram container of Miralax (polyethylene glycol 3350), four (4) 5 mg Dulcolax (bisacodyl) tablets, and 64-ounces of Gatorade (sports drink) - no red, orange, or purple. These may be purchased at any pharmacy without a prescription. Generic products are permissible.  Arrange responsible transportation for day of the procedure.      DAY BEFORE THE PROCEDURE:  CLEAR liquids only for entire day prior. Nothing red, orange or purple.  You MAY have:  Soda  Water  Broth Gatorade  Jell-O  Popsicles Coffee/tea without  Milk/creamer     YOU MAY NOT HAVE:  Solid foods  Milk and milk products  Juice with pulp    BOWEL PREPARATION: Includes: One (1) 238-gram container of Miralax (polyethylene glycol 3350), four (4) 5 mg Dulcolax (bisacodyl) tablets, and 64-ounces of Gatorade (sports drink). Preparation may be refrigerated. Entire bowel prep should be completed.    Afternoon before the procedure (2:00 pm - 5:00 pm):  Take two (2) 5 mg Dulcolax laxative tablets.    Evening before the procedure (6:00 pm):  Mix entire container of Miralax with 64-ounces of Gatorade and shake until all medication is dissolved.  Begin drinking solution. Drink an eight (8) ounce cup every 10-15 minutes until you have consumed half (32 ounces) of the solution. Refrigerate remaining solution.    Night before the  procedure (8:00 pm):  Take two (2) 5 mg Dulcolax laxative tablets.    Beginning 5 hours before your procedure:  Drink the remaining amount of prepared solution (32 ounces). Drink an eight (8) ounce cup every 10-15 minutes until you have consumed the remaining solution.      Bowel prep should be completed 4 hours prior to procedure time.  NOTHING TO EAT OR DRINK AFTER MIDNIGHT -EXCEPT YOUR BOWEL PREP                                                                                                                                                                                DAY OF THE PROCEDURE:  You may brush your teeth.  Leave all jewelry at home. Take out any facial piercings, if able.  Please arrive for your procedure as indicated by the OR / GI Lab / Endoscopy Unit. The hospital will contact you the day before with your exact arrival time.  Make sure you have arranged ahead of time for a responsible adult (18 or older) to accompany and drive you home after the procedure. Please discuss any transportation concerns with our staff prior to your procedure.  The effects of the anesthesia can persist for 24 hours. After receiving the sedation, you must exercise caution before engaging in any activity that could harm yourself and others (such as driving a car). Do not make any important decisions or do not drink any alcoholic beverages during this time period.  After your procedure, you may have anything you’d like to eat or drink. You will probably want to start with something light. Please include plenty of fluids. Avoid items that cause gas such as sodas and salads.      SPECIAL INSTRUCTIONS:    For patients currently taking blood thinners and/or antiplatelet therapy our office will contact the prescribing provider. Our office will contact you with any required changes to your medication regimen.  Blood thinner (i.e. - Coumadin, Pradaxa, Lovenox, Xarelto, Eliquis)  Antiplatelet (i.e. - Plavix, Aggrenox, Effient,  Brilinta)      Diabetes:  If you are Diabetic, please see separate Diabetic Instruction Sheet.  Prescribed medications:  Do not stop your aspirin, or any of your other medications (unless instructed otherwise).  Take the rest of your prescribed medications with small sips of water at least 2 hours prior to your procedure.      NOTHING TO EAT OR DRINK (INCLUDING CHEWING GUM) AFTER 12 MIDNIGHT PRIOR TO YOUR PROCEDURE EXCEPT YOUR BOWEL PREP.        For any questions or concerns related to your bowel preparation or pre-procedure instructions, please contact our office.  Thank you for choosing [facility]’s Colon & Rectal Surgery!    Revised 1/2023    [phone]                                                                                          Medicine Instructions for Adults with Diabetes who Need a Bowel Prep       Follow these instructions when a BOWEL PREP is required for your procedure or surgery!    NOTE:   GLP-1 Agonists taken weekly: do not take in the 7 days before your procedure   SGLT-2 Inhibitors: do not take in the 4 days before your procedure     On the Day Before Surgery/Procedure  If you are having a procedure (e.g. Colonoscopy) or surgery that requires a bowel prep and you may have at least a clear liquid diet, follow the directions below based on the type of medicine you take for your diabetes.     Type of Medicine You Take Examples What to do   Pre-Mixed Insulin - Intermediate Acting Humalog® 75/25, Humulin® 70/30, Novolog® 70/30, Regular Insulin Take ½ your regular dose the evening before your procedure   Rapid/Fast Acting Insulin Humalog® U200, NovoLog®, Apidra®, Fiasp® Take ½ your regular dose the evening before your procedure.   Long-Acting Insulin Lantus®, Levemir®, Tresiba®, Toujeo®, Basaglar® Take your FULL regular dose the day before procedure   Oral Sulfonylurea Glipizide/Glimepiride/Glucotrol® Take ½ your regular dose the evening before your procedure   Other Oral Diabetes Medicines  Metformin®, Glucophage®, Glucophage XR®, Riomet®, Glumetza®), Actose®, Avandia®, Glyset®, Prandin® Take your regular dose with dinner in the evening before your procedure   GLP-1 Agonists AdlyxinÒ, ByettaÒ, BydureonÒ, OzempicÒ, SoliquaÒ, TanzeumÒ, TrulicityÒ, VictozaÒ, Saxenda®, Rybelsus® If taken daily, take as normal    If taken weekly, do not take this medicine for 7 days before your procedure including the day of the procedure (resume taking after the procedure)   SGLT-2 Inhibitors Jardiance®, Invokana®, Farxiga®,   Steglatro®, Brenzavvy®, Qtern®, Segluromet®, Glyxambi®, Synjardy®, Synjardy XR®, Invokamet®, Invokamet XR®, Trijary XR®, Xigduo XR®, Steglujan® Do not take for 4 days before your procedure including the day of the procedure (resume taking after the procedure)                More information continued on back                    Medicine Instructions for Adults with Diabetes who Need a Bowel Prep  Page 2      On the Day of Surgery/Procedure  Follow the directions below based on the type of medicine you take for your diabetes.     Type of Medicine You Take Examples What to do   Long-Acting Insulin Lantus®, Levemir®, Tresiba®, Toujeo®, Basaglar®, Semglee®   If you usually take your Long-Acting Insulin in the morning, take the full dose as scheduled.   GLP-1 Agonists AdlyxinÒ, ByettaÒ, BydureonÒ, OzempicÒ, SoliquaÒ, TanzeumÒ, TrulicityÒ, VictozaÒ, Saxenda®, Rybelsus® Do NOT take this medicine on the day of your procedure (resume taking after the procedure)       On the Day of Surgery/Procedure (continued)  Except for the morning Long-Acting Insulin, DO NOT take ANY diabetic medicine on the day of your procedure unless you were instructed by the doctor who manages your diabetes medicines.    Continue to check your blood sugars.  If you have an insulin pump, ask your endocrinologist for instructions at least 3 days before your procedure. NOTE: If you are not able to ask your endocrinologist in advance, on  the day of the procedure set your insulin pump to your basal rate only. Bring your insulin pump supplies to the hospital.     If you have any questions about taking your diabetes medicines prior to your procedure, please contact the doctor who manages your diabetes medicines.

## 2024-11-14 ENCOUNTER — TELEPHONE (OUTPATIENT)
Dept: GASTROENTEROLOGY | Facility: HOSPITAL | Age: 54
End: 2024-11-14

## 2024-11-17 ENCOUNTER — TELEPHONE (OUTPATIENT)
Dept: OTHER | Facility: OTHER | Age: 54
End: 2024-11-17

## 2024-11-17 NOTE — TELEPHONE ENCOUNTER
Patient calling to cancel colonoscopy for tomorrow at 8:00am. Nursing supervisor for  notified. Messaged sent to clerical staff for colorectal surgery.    Cancelled for flare of low back pain, declined triage.

## 2024-11-26 ENCOUNTER — HOSPITAL ENCOUNTER (OUTPATIENT)
Dept: MAMMOGRAPHY | Facility: MEDICAL CENTER | Age: 54
Discharge: HOME | End: 2024-11-26
Payer: COMMERCIAL

## 2024-11-26 VITALS — WEIGHT: 169.97 LBS | HEIGHT: 66 IN | BODY MASS INDEX: 27.32 KG/M2

## 2024-11-26 DIAGNOSIS — Z12.31 OTHER SCREENING MAMMOGRAM: ICD-10-CM

## 2024-11-26 PROCEDURE — 77063 BREAST TOMOSYNTHESIS BI: CPT

## 2024-11-26 PROCEDURE — 77067 SCR MAMMO BI INCL CAD: CPT

## 2025-02-03 ENCOUNTER — APPOINTMENT (OUTPATIENT)
Age: 55
End: 2025-02-03
Payer: COMMERCIAL

## 2025-02-03 ENCOUNTER — OFFICE VISIT (OUTPATIENT)
Age: 55
End: 2025-02-03
Payer: COMMERCIAL

## 2025-02-03 VITALS — WEIGHT: 171 LBS | BODY MASS INDEX: 27.48 KG/M2 | HEIGHT: 66 IN

## 2025-02-03 DIAGNOSIS — M79.604 RIGHT LEG PAIN: ICD-10-CM

## 2025-02-03 DIAGNOSIS — M25.571 PAIN, JOINT, ANKLE AND FOOT, RIGHT: ICD-10-CM

## 2025-02-03 DIAGNOSIS — M54.16 LUMBAR RADICULOPATHY: Primary | ICD-10-CM

## 2025-02-03 DIAGNOSIS — M77.12 LATERAL EPICONDYLITIS OF LEFT ELBOW: ICD-10-CM

## 2025-02-03 PROCEDURE — 99214 OFFICE O/P EST MOD 30 MIN: CPT | Performed by: ORTHOPAEDIC SURGERY

## 2025-02-03 PROCEDURE — 73610 X-RAY EXAM OF ANKLE: CPT

## 2025-02-03 PROCEDURE — 73590 X-RAY EXAM OF LOWER LEG: CPT

## 2025-02-03 RX ORDER — MELOXICAM 15 MG/1
15 TABLET ORAL DAILY
Qty: 20 TABLET | Refills: 0 | Status: SHIPPED | OUTPATIENT
Start: 2025-02-03

## 2025-02-03 NOTE — PROGRESS NOTES
ASSESSMENT:  RIGHT low back pain with radiating numbness/pain down right leg, possible lumbar radiculopathy  RIGHT lateral epicondylitis     PLAN:  Discussed treatment options  MRI scheduled for 2/15/25 lumbar spine, ordered by PCP   Recommend spine and pain team for possible injections, would recommend follow up after MRI completed   Referral to spine and pain made today   Continue PT for low back and start PT for lateral epicondylitis    Mobic/meloxicam prescribed today as well for the lateral epicondylitis and low back/radicular symptoms      REASON FOR VISIT:  Chief Complaint   Patient presents with    Low back pain with radiation into foot       HISTORY OF PRESENT ILLNESS:  RIGHT low back pain with radiation down leg into foot     Pain began about 2 months ago   Radicular pain from hip to toes  Worse pain is low back, down to knee to ankle, shooting and burning pain    Unable to sit or walk comfortably  Denies acute injury, gradual onset of pain   Has associated groin pain as well, hx of hip arthritis     Taking 800 mg Ibuprofen for pain control, slight relief     Has pain in other joints at this time as well     MRI scheduled for 2/15/25 lumbar spine, ordered by PCP   Had [place] hip MRI 2024, in [place] and lumbar spine Mri in [place] which showed normal hip and disc herniation in lumbar spine per report     Hx of R knee surgery, for ligamentous injury   Hx of L knee surgery, meniscus tear       L ELBOW  Pain on the lateral portion   Worse with movement   Worse with any wrist extension or forearm movements  No treatment for the elbow      Past Medical History:   Diagnosis Date    Acute medial meniscus tear of left knee     Arthritis     Back pain     Cigarette smoker     Disease of thyroid gland     Hypertension         Past Surgical History:   Procedure Laterality Date    ABDOMINOPLASTY      ARTHROSCOPIC REPAIR ACL Right     AUGMENTATION MAMMAPLASTY Bilateral 2017    saline    CATARACT EXTRACTION        SECTION      1/3/1991    [place] ARTHROSCOPY KNEE W/MENISCUS RPR MEDIAL/LATERAL Left 10/24/2023    Procedure: ARTHROSCOPY KNEE, meniscus root repair;  Surgeon: Erik Mcgill MD;  Location: [facility];  Service: Orthopedics    REPEAT  SECTION      1992, 12/3/1997       Social History     Socioeconomic History    Marital status:      Spouse name: Not on file    Number of children: Not on file    Years of education: Not on file    Highest education level: Not on file   Occupational History    Not on file   Tobacco Use    Smoking status: Every Day     Current packs/day: 0.50     Average packs/day: 0.5 packs/day for 33.0 years (16.5 ttl pk-yrs)     Types: Cigarettes     Passive exposure: Current    Smokeless tobacco: Never    Tobacco comments:     Last cigarette 10/23   Vaping Use    Vaping status: Never Used   Substance and Sexual Activity    Alcohol use: Yes     Comment: rare    Drug use: Never    Sexual activity: Not on file   Other Topics Concern    Not on file   Social History Narrative    ** Merged History Encounter **          Social Drivers of Health     Financial Resource Strain: Not on file   Food Insecurity: Not on file   Transportation Needs: Not on file   Physical Activity: Not on file   Stress: Not on file   Social Connections: Not on file   Intimate Partner Violence: Not on file   Housing Stability: Not on file       MEDICATIONS:    Current Outpatient Medications:     amLODIPine (NORVASC) 5 mg tablet, Take 1 tablet (5 mg total) by mouth daily, Disp: 90 tablet, Rfl: 1    cholecalciferol (VITAMIN D3) 1,000 units tablet, Take 1,000 Units by mouth daily, Disp: , Rfl:     COLLAGEN PO, Take by mouth in the morning, Disp: , Rfl:     cyanocobalamin 1,000 mcg/mL, Inject 1 mL (1,000 mcg total) into a muscle once a week, Disp: 12 mL, Rfl: 1    ibuprofen (MOTRIN) 800 mg tablet, TAKE 1 TABLET (800 MG TOTAL) BY MOUTH EVERY 6 (SIX) HOURS AS NEEDED FOR MILD PAIN, Disp: 60 tablet, Rfl: 0     "levothyroxine 100 mcg tablet, Take 1 tablet (100 mcg total) by mouth daily, Disp: 120 tablet, Rfl: 0    olmesartan (BENICAR) 40 mg tablet, Take 1 tablet (40 mg total) by mouth daily, Disp: 90 tablet, Rfl: 3    SYRINGE-NEEDLE, DISP, 3 ML 25G X 5/8\" 3 ML MISC, Use once a week, Disp: 12 each, Rfl: 1    aspirin 81 mg chewable tablet, Chew 1 tablet (81 mg total) 2 (two) times a day for 14 days Take with food., Disp: 28 tablet, Rfl: 0    benzonatate (TESSALON PERLES) 100 mg capsule, Take 1 capsule (100 mg total) by mouth 3 (three) times a day as needed for cough, Disp: 20 capsule, Rfl: 0    clotrimazole-betamethasone (LOTRISONE) 1-0.05 % cream, Apply topically 2 (two) times a day, Disp: 60 g, Rfl: 0    meloxicam (Mobic) 15 mg tablet, Take 1 tablet (15 mg total) by mouth daily (Patient not taking: Reported on 12/19/2023), Disp: 90 tablet, Rfl: 3    methylPREDNISolone 4 MG tablet therapy pack, Use as directed on package. Do not take other anti-inflammatory (NSAID) medications while on Medrol dose ally. You should not suddenly stop using these medications. Follow the instructions listed on the prescription about tapering your dose. (Patient not taking: Reported on 1/26/2024), Disp: 21 tablet, Rfl: 0    naproxen (NAPROSYN) 500 mg tablet, Take 1 tablet (500 mg total) by mouth 2 (two) times a day with meals (Patient not taking: Reported on 2/9/2024), Disp: 20 tablet, Rfl: 0    Omega-3 Fatty Acids (FISH OIL PO), Take by mouth in the morning (Patient not taking: Reported on 3/1/2024), Disp: , Rfl:     ondansetron (Zofran ODT) 4 mg disintegrating tablet, Take 1 tablet (4 mg total) by mouth every 6 (six) hours as needed for nausea or vomiting (Patient not taking: Reported on 10/27/2023), Disp: 12 tablet, Rfl: 0    oxyCODONE (ROXICODONE) 5 immediate release tablet, Take 1 tablet (5 mg total) by mouth every 4 (four) hours as needed for severe pain Max Daily Amount: 30 mg (Patient not taking: Reported on 10/30/2024), Disp: 30 tablet, " Rfl: 0    phentermine (ADIPEX-P) 37.5 MG tablet, Take 1 tablet (37.5 mg total) by mouth daily, Disp: 90 tablet, Rfl: 0    rosuvastatin (CRESTOR) 10 MG tablet, Take 1 tablet (10 mg total) by mouth daily, Disp: 100 tablet, Rfl: 3    varenicline (CHANTIX) 1 mg tablet, TAKE 1 TABLET BY MOUTH TWICE A DAY, Disp: 168 tablet, Rfl: 3    ALLERGIES:  No Known Allergies    MAJOR FINDINGS:  Bernard Roblero is pleasant, healthy appearing, and in no acute distress.     RIGHT lower extremity   Full painless hip ROM  No pain with FADIR  Nontender over GT bursa  Mild +SLR  Sensation intact sp/dp/t  Strength intact 5/5 dorsiflexion/plantarflexion/SLR   Extremity wwp  No calf pain      LEFT elbow  Full ROM  Tender over lateral epicondyle  +pain over lateral epicondyle with resisted wrist extension         IMAGING  I personally reviewed and interpreted the imaging studies  X-rays performed on the tib/fib/ankle show   no acute osseous abnormality  Prior xrays of lumbar spine and hip: no acute osseous abnormality       CC:  MD Josefa Yang Wael, MD

## 2025-02-16 ENCOUNTER — HOSPITAL ENCOUNTER (OUTPATIENT)
Dept: MRI IMAGING | Facility: HOSPITAL | Age: 55
Discharge: HOME | End: 2025-02-16

## 2025-02-16 DIAGNOSIS — M54.16 LUMBAR RADICULOPATHY: ICD-10-CM

## 2025-03-02 ENCOUNTER — TELEPHONE (OUTPATIENT)
Dept: OTHER | Facility: OTHER | Age: 55
End: 2025-03-02

## 2025-03-02 NOTE — TELEPHONE ENCOUNTER
Patient is calling regarding cancelling an appointment.    Date/Time:3/3/2025    Patient was rescheduled: YES [] NO [x]    Patient requesting call back to reschedule: YES [x] NO []

## 2025-03-13 ENCOUNTER — APPOINTMENT (OUTPATIENT)
Age: 55
End: 2025-03-13
Payer: COMMERCIAL

## 2025-03-13 ENCOUNTER — OFFICE VISIT (OUTPATIENT)
Age: 55
End: 2025-03-13
Payer: COMMERCIAL

## 2025-03-13 DIAGNOSIS — M77.12 LATERAL EPICONDYLITIS OF LEFT ELBOW: Primary | ICD-10-CM

## 2025-03-13 DIAGNOSIS — M25.522 PAIN IN LEFT ELBOW: ICD-10-CM

## 2025-03-13 PROCEDURE — 99213 OFFICE O/P EST LOW 20 MIN: CPT | Performed by: ORTHOPAEDIC SURGERY

## 2025-03-13 PROCEDURE — 73080 X-RAY EXAM OF ELBOW: CPT

## 2025-03-13 NOTE — PROGRESS NOTES
Assessment & Plan  Lateral epicondylitis of left elbow  Discussed treatment options  New script for PT sent, can complete 1-2 sessions and then complete at home exercise program   Continue OTC pain meds as needed   Discussed PRP vs steroid injections if needed  Would recommend to continue with conservative management, and if no improvement, consider PRP injection  Referral to Dr. Gabriel for possible PRP injection if needed  Also consider Tenjet with Dr. Tyler if needed  Follow up as needed         REASON FOR VISIT:  Chief Complaint   Patient presents with    LEFT elbow pain        INTERVAL Hx (3/13/25)  Moderate/severe pain in the lateral epicondyle  Has tried Mobic, minimal relief of symptoms   Not completed any PT at this time       HISTORY OF PRESENT ILLNESS:  L ELBOW  Pain on the lateral portion   Worse with movement   Worse with any wrist extension or forearm movements  No treatment for the elbow      Past Medical History:   Diagnosis Date    Acute medial meniscus tear of left knee     Arthritis     Back pain     Cigarette smoker     Disease of thyroid gland     Hypertension         Past Surgical History:   Procedure Laterality Date    ABDOMINOPLASTY      ARTHROSCOPIC REPAIR ACL Right     AUGMENTATION MAMMAPLASTY Bilateral 2017    saline    CATARACT EXTRACTION       SECTION      1/3/1991    PA ARTHROSCOPY KNEE W/MENISCUS RPR MEDIAL/LATERAL Left 10/24/2023    Procedure: ARTHROSCOPY KNEE, meniscus root repair;  Surgeon: Erik Mcgill MD;  Location: [facility];  Service: Orthopedics    REPEAT  SECTION      1992, 12/3/1997       Social History     Socioeconomic History    Marital status:      Spouse name: Not on file    Number of children: Not on file    Years of education: Not on file    Highest education level: Not on file   Occupational History    Not on file   Tobacco Use    Smoking status: Every Day     Current packs/day: 0.50     Average packs/day: 0.5 packs/day for  33.0 years (16.5 ttl pk-yrs)     Types: Cigarettes     Passive exposure: Current    Smokeless tobacco: Never    Tobacco comments:     Last cigarette 10/23   Vaping Use    Vaping status: Never Used   Substance and Sexual Activity    Alcohol use: Yes     Comment: rare    Drug use: Never    Sexual activity: Not on file   Other Topics Concern    Not on file   Social History Narrative    ** Merged History Encounter **          Social Drivers of Health     Financial Resource Strain: Not on file   Food Insecurity: Not on file   Transportation Needs: Not on file   Physical Activity: Not on file   Stress: Not on file   Social Connections: Not on file   Intimate Partner Violence: Not on file   Housing Stability: Not on file       MEDICATIONS:    Current Outpatient Medications:     amLODIPine (NORVASC) 5 mg tablet, Take 1 tablet (5 mg total) by mouth daily, Disp: 90 tablet, Rfl: 1    aspirin 81 mg chewable tablet, Chew 1 tablet (81 mg total) 2 (two) times a day for 14 days Take with food., Disp: 28 tablet, Rfl: 0    benzonatate (TESSALON PERLES) 100 mg capsule, Take 1 capsule (100 mg total) by mouth 3 (three) times a day as needed for cough, Disp: 20 capsule, Rfl: 0    cholecalciferol (VITAMIN D3) 1,000 units tablet, Take 1,000 Units by mouth daily, Disp: , Rfl:     clotrimazole-betamethasone (LOTRISONE) 1-0.05 % cream, Apply topically 2 (two) times a day, Disp: 60 g, Rfl: 0    COLLAGEN PO, Take by mouth in the morning, Disp: , Rfl:     cyanocobalamin 1,000 mcg/mL, Inject 1 mL (1,000 mcg total) into a muscle once a week, Disp: 12 mL, Rfl: 1    cyclobenzaprine (FLEXERIL) 5 mg tablet, Take 1 tablet (5 mg total) by mouth 3 (three) times a day as needed for muscle spasms, Disp: 30 tablet, Rfl: 0    Diclofenac Sodium (VOLTAREN) 1 %, Apply 2 g topically 4 (four) times a day, Disp: 90 g, Rfl: 3    diclofenac sodium (VOLTAREN) 50 mg EC tablet, Take 1 tablet (50 mg total) by mouth 2 (two) times a day, Disp: 30 tablet, Rfl: 0     "ibuprofen (MOTRIN) 800 mg tablet, TAKE 1 TABLET (800 MG TOTAL) BY MOUTH EVERY 6 (SIX) HOURS AS NEEDED FOR MILD PAIN, Disp: 60 tablet, Rfl: 0    levothyroxine 100 mcg tablet, Take 1 tablet (100 mcg total) by mouth daily, Disp: 120 tablet, Rfl: 0    meloxicam (Mobic) 15 mg tablet, Take 1 tablet (15 mg total) by mouth daily, Disp: 20 tablet, Rfl: 0    methylPREDNISolone 4 MG tablet therapy pack, Use as directed on package. Do not take other anti-inflammatory (NSAID) medications while on Medrol dose ally. You should not suddenly stop using these medications. Follow the instructions listed on the prescription about tapering your dose. (Patient not taking: Reported on 1/26/2024), Disp: 21 tablet, Rfl: 0    olmesartan (BENICAR) 40 mg tablet, Take 1 tablet (40 mg total) by mouth daily, Disp: 90 tablet, Rfl: 3    Omega-3 Fatty Acids (FISH OIL PO), Take by mouth in the morning (Patient not taking: Reported on 3/1/2024), Disp: , Rfl:     ondansetron (Zofran ODT) 4 mg disintegrating tablet, Take 1 tablet (4 mg total) by mouth every 6 (six) hours as needed for nausea or vomiting (Patient not taking: Reported on 10/27/2023), Disp: 12 tablet, Rfl: 0    oxyCODONE (ROXICODONE) 5 immediate release tablet, Take 1 tablet (5 mg total) by mouth every 4 (four) hours as needed for severe pain Max Daily Amount: 30 mg (Patient not taking: Reported on 10/30/2024), Disp: 30 tablet, Rfl: 0    phentermine (ADIPEX-P) 37.5 MG tablet, Take 1 tablet (37.5 mg total) by mouth daily, Disp: 90 tablet, Rfl: 0    rosuvastatin (CRESTOR) 10 MG tablet, Take 1 tablet (10 mg total) by mouth daily, Disp: 100 tablet, Rfl: 3    SYRINGE-NEEDLE, DISP, 3 ML 25G X 5/8\" 3 ML MISC, Use once a week, Disp: 12 each, Rfl: 1    triamcinolone (KENALOG) 0.5 % cream, Apply topically 3 (three) times a day, Disp: 90 g, Rfl: 1    varenicline (CHANTIX) 1 mg tablet, TAKE 1 TABLET BY MOUTH TWICE A DAY, Disp: 168 tablet, Rfl: 3    ALLERGIES:  No Known Allergies    MAJOR " FINDINGS:  Bernard Roblero is pleasant, healthy appearing, and in no acute distress.       LEFT elbow  Full ROM  Tender over lateral epicondyle  +pain over lateral epicondyle with resisted wrist extension  SILT  Motor intact  wwp      IMAGING  I personally reviewed and interpreted the imaging studies  X-rays performed on the tib/fib/ankle show   No acute osseous abnormality  Prior xrays of lumbar spine and hip: no acute osseous abnormality       CC:  MD Josefa Yang Wael, MD

## 2025-03-13 NOTE — ASSESSMENT & PLAN NOTE
Discussed treatment options  New script for PT sent  Continue OTC pain meds as needed   Discussed PRP vs steroid injections  Referral for PRP injections   Follow up as needed

## 2025-03-13 NOTE — ASSESSMENT & PLAN NOTE
Discussed treatment options  New script for PT sent, can complete 1-2 sessions and then complete at home exercise program   Continue OTC pain meds as needed   Discussed PRP vs steroid injections if needed  Would recommend to continue with conservative management, and if no improvement, consider PRP injection  Referral to Dr. Gabriel for possible PRP injection if needed  Also consider Tenjet with Dr. Tyler if needed  Follow up as needed

## 2025-03-18 ENCOUNTER — HOSPITAL ENCOUNTER (OUTPATIENT)
Facility: MEDICAL CENTER | Age: 55
Discharge: HOME | End: 2025-03-18

## 2025-03-18 DIAGNOSIS — M54.16 LUMBAR RADICULOPATHY: ICD-10-CM

## 2025-03-19 ENCOUNTER — OFFICE VISIT (OUTPATIENT)
Dept: OBGYN CLINIC | Facility: OTHER | Age: 55
End: 2025-03-19
Payer: COMMERCIAL

## 2025-03-19 VITALS — BODY MASS INDEX: 27.48 KG/M2 | HEIGHT: 66 IN | WEIGHT: 171 LBS

## 2025-03-19 DIAGNOSIS — M77.12 LATERAL EPICONDYLITIS OF LEFT ELBOW: Primary | ICD-10-CM

## 2025-03-19 DIAGNOSIS — M25.522 PAIN IN LEFT ELBOW: ICD-10-CM

## 2025-03-19 PROCEDURE — 99243 OFF/OP CNSLTJ NEW/EST LOW 30: CPT | Performed by: ORTHOPAEDIC SURGERY

## 2025-03-19 NOTE — PROGRESS NOTES
Name: Bernard Roblero      : 1970      MRN: [id number]  Encounter Provider: Maria Isabel Gabriel MD  Encounter Date: 3/19/2025   Encounter department: [facility]  :  Assessment & Plan  Lateral epicondylitis of left elbow  I explained my current clinical findings and reviewed the radiological findings with the patient.  The anatomy and physiology of lateral epicondylitis was discussed with the patient today.  Typically, a traumatic injury or repetitive use may cause a partial or complete tear of the extensor carpi radialis brevis muscle.  This creates pain over the lateral epicondyle.  This pain typically is made worse with palm down lifting activities as well as anything that involves strength and stability of the wrist.  The pain may radiate from the wrist up to the elbow.  At times, the shoulder may be weak as well which can predispose or cause continuation of the problem.  Conservative treatment usually cures a majority of patients; however, this may take up to 6-9 months.  Conservative treatment options typically include activity modification, therapy for strengthening of the shoulder and elbow, nocturnal wrist support splints, tennis elbow straps, and possible corticosteroid injections.  Corticosteroid injections do not change the natural history of this process, may decrease the pain temporarily, and may increase the risk of recurrence.  Surgery is required in fewer than 10% of patients.  I discussed in detail ultrasound-guided PRP procedure as well as Tenex procedure for her lateral epicondylitis.  We discussed risks and benefits of both these options as well as postprocedure limitations and rehabilitation protocol.  A printed handout was provided to the patient with regards to PRP as well as Tenex procedure.  Patient wishes to further review her options and would like to call us back if she wishes to proceed either with PRP or Tenex procedure.  In the interim, I do  recommend her to start physical therapy and wear a nighttime wrist brace for symptomatic relief.  Orders:    Ambulatory Referral to Orthopedic Surgery    Pain in left elbow    Orders:    Ambulatory Referral to Orthopedic Surgery        History of Present Illness   HPI  Bernard Roblero is a 55 y.o. female who presents for evaluation of left lateral elbow pain.  She has been referred in this regard by Dr. Stephen Gibson for consideration of ultrasound-guided PRP versus Tenex procedure.  Patient reports that the left lateral elbow pain is about 2 months in duration and likely aggravated after doing some heavy furniture moving.  Pain radiates distally to the dorsal radial forearm and made worse with elbow movement or gripping activities.  Patient has tried oral NSAIDs as well as forearm counterforce bracing without much relief.  She has been referred for physical therapy by Dr. Gibson.  However, she has not yet started physical therapy rehabilitation.  History obtained from: patient    Review of Systems  Current Outpatient Medications on File Prior to Visit   Medication Sig Dispense Refill    amLODIPine (NORVASC) 5 mg tablet Take 1 tablet (5 mg total) by mouth daily 90 tablet 1    cholecalciferol (VITAMIN D3) 1,000 units tablet Take 1,000 Units by mouth daily      COLLAGEN PO Take by mouth in the morning      cyanocobalamin 1,000 mcg/mL Inject 1 mL (1,000 mcg total) into a muscle once a week 12 mL 1    cyclobenzaprine (FLEXERIL) 5 mg tablet Take 1 tablet (5 mg total) by mouth 3 (three) times a day as needed for muscle spasms 30 tablet 0    Diclofenac Sodium (VOLTAREN) 1 % Apply 2 g topically 4 (four) times a day 90 g 3    diclofenac sodium (VOLTAREN) 50 mg EC tablet Take 1 tablet (50 mg total) by mouth 2 (two) times a day 30 tablet 0    ibuprofen (MOTRIN) 800 mg tablet TAKE 1 TABLET (800 MG TOTAL) BY MOUTH EVERY 6 (SIX) HOURS AS NEEDED FOR MILD PAIN 60 tablet 0    levothyroxine 100 mcg tablet Take 1 tablet (100 mcg total) by  "mouth daily 120 tablet 0    meloxicam (Mobic) 15 mg tablet Take 1 tablet (15 mg total) by mouth daily 20 tablet 0    olmesartan (BENICAR) 40 mg tablet Take 1 tablet (40 mg total) by mouth daily 90 tablet 3    SYRINGE-NEEDLE, DISP, 3 ML 25G X 5/8\" 3 ML MISC Use once a week 12 each 1    triamcinolone (KENALOG) 0.5 % cream Apply topically 3 (three) times a day 90 g 1    aspirin 81 mg chewable tablet Chew 1 tablet (81 mg total) 2 (two) times a day for 14 days Take with food. 28 tablet 0    benzonatate (TESSALON PERLES) 100 mg capsule Take 1 capsule (100 mg total) by mouth 3 (three) times a day as needed for cough 20 capsule 0    clotrimazole-betamethasone (LOTRISONE) 1-0.05 % cream Apply topically 2 (two) times a day 60 g 0    methylPREDNISolone 4 MG tablet therapy pack Use as directed on package. Do not take other anti-inflammatory (NSAID) medications while on Medrol dose ally. You should not suddenly stop using these medications. Follow the instructions listed on the prescription about tapering your dose. (Patient not taking: Reported on 1/26/2024) 21 tablet 0    Omega-3 Fatty Acids (FISH OIL PO) Take by mouth in the morning (Patient not taking: Reported on 3/1/2024)      ondansetron (Zofran ODT) 4 mg disintegrating tablet Take 1 tablet (4 mg total) by mouth every 6 (six) hours as needed for nausea or vomiting (Patient not taking: Reported on 10/27/2023) 12 tablet 0    oxyCODONE (ROXICODONE) 5 immediate release tablet Take 1 tablet (5 mg total) by mouth every 4 (four) hours as needed for severe pain Max Daily Amount: 30 mg (Patient not taking: Reported on 10/30/2024) 30 tablet 0    phentermine (ADIPEX-P) 37.5 MG tablet Take 1 tablet (37.5 mg total) by mouth daily 90 tablet 0    rosuvastatin (CRESTOR) 10 MG tablet Take 1 tablet (10 mg total) by mouth daily 100 tablet 3    varenicline (CHANTIX) 1 mg tablet TAKE 1 TABLET BY MOUTH TWICE A  tablet 3     No current facility-administered medications on file prior to " "visit.         Objective   LMP 08/09/2022      Physical Exam  Nursing note reviewed.              Left Elbow Exam     Tenderness   The patient is experiencing tenderness in the lateral epicondyle.     Range of Motion   Extension:  -10   Flexion:  normal   Pronation:  normal   Supination:  normal     Muscle Strength   Pronation:  5/5   Supination:  4/5     Tests   Varus: negative  Valgus: negative  Tinel's sign (cubital tunnel): negative    Other   Erythema: absent    Comments:  Pain with resisted left wrist dorsiflexion and resisted left middle finger extension.  Grade 5/5 left thumb extension.           I have personally reviewed pertinent films in PACS and my interpretation is plain radiograph of the left elbow performed on 3/13/2025 does not reveal any acute osseous injury or significant degenerative changes..   Procedures  Portions of the record may have been created with voice recognition software. Occasional wrong word or \"sound alike\" substitutions may have occurred due to the inherent limitations of voice recognition software. Please review the chart carefully and recognize, using context, where substitutions/typographical errors may have occurred.      "

## 2025-03-19 NOTE — ASSESSMENT & PLAN NOTE
I explained my current clinical findings and reviewed the radiological findings with the patient.  The anatomy and physiology of lateral epicondylitis was discussed with the patient today.  Typically, a traumatic injury or repetitive use may cause a partial or complete tear of the extensor carpi radialis brevis muscle.  This creates pain over the lateral epicondyle.  This pain typically is made worse with palm down lifting activities as well as anything that involves strength and stability of the wrist.  The pain may radiate from the wrist up to the elbow.  At times, the shoulder may be weak as well which can predispose or cause continuation of the problem.  Conservative treatment usually cures a majority of patients; however, this may take up to 6-9 months.  Conservative treatment options typically include activity modification, therapy for strengthening of the shoulder and elbow, nocturnal wrist support splints, tennis elbow straps, and possible corticosteroid injections.  Corticosteroid injections do not change the natural history of this process, may decrease the pain temporarily, and may increase the risk of recurrence.  Surgery is required in fewer than 10% of patients.  I discussed in detail ultrasound-guided PRP procedure as well as Tenex procedure for her lateral epicondylitis.  We discussed risks and benefits of both these options as well as postprocedure limitations and rehabilitation protocol.  A printed handout was provided to the patient with regards to PRP as well as Tenex procedure.  Patient wishes to further review her options and would like to call us back if she wishes to proceed either with PRP or Tenex procedure.  In the interim, I do recommend her to start physical therapy and wear a nighttime wrist brace for symptomatic relief.  Orders:    Ambulatory Referral to Orthopedic Surgery

## 2025-04-10 ENCOUNTER — EVALUATION (OUTPATIENT)
Age: 55
End: 2025-04-10
Payer: COMMERCIAL

## 2025-04-10 DIAGNOSIS — M54.12 CERVICAL RADICULOPATHY: Primary | ICD-10-CM

## 2025-04-10 DIAGNOSIS — M25.522 LEFT ELBOW PAIN: ICD-10-CM

## 2025-04-10 PROCEDURE — 97140 MANUAL THERAPY 1/> REGIONS: CPT

## 2025-04-10 PROCEDURE — 97162 PT EVAL MOD COMPLEX 30 MIN: CPT

## 2025-04-10 NOTE — PROGRESS NOTES
PT Evaluation     Today's date: 4/10/2025  Patient name: Bernard Roblero  : 1970  MRN: [id number]  Referring provider: Dixie Foster PA-C  Dx:   Encounter Diagnosis     ICD-10-CM    1. Cervical radiculopathy  M54.12       2. Left elbow pain  M25.522           Start Time: 1015  Stop Time: 1100  Total time in clinic (min): 45 minutes    Assessment  Impairments: abnormal coordination, abnormal muscle firing, abnormal muscle tone, abnormal or restricted ROM, activity intolerance, impaired physical strength, lacks appropriate home exercise program, pain with function, poor posture , poor body mechanics, participation limitations, activity limitations and endurance  Symptom irritability: high    Assessment details: Pt presents with s/s consistent with cervical radiculopathy and lateral epicondylitis of the L elbow.   Pt impairments: decreased cervical AROM, n/t radiating through the L UE, pain with gripping and AROM of the L UE, (+) ULTT of median, ulnar and radial nerves of the L UE, decreased UE strength/ strength compared to contralateral side, and decreased participation in fADLs, caretaking for her mother and sleeping. Cervical retraction immediately restored elbow/shoulder function of the L UE. HEP demonstrated and reviewed with Pt.  Pt would benefit from skilled PT services in order to meet goals and return to PLOF.  Barriers to therapy: smoking  Understanding of Dx/Px/POC: good     Prognosis: good    Goals  Pt will adhere to HEP and demonstrate proper form per treating PT discretion in 2 weeks.    Pt will reduce resting pain levels by 2 points on the VAS in 2 weeks.     Pt will improve AROM by 5-10 degrees of affected limb in 2 weeks.    Pt will improve AROM of UE WNL in 4 weeks.    Pt will reduce neural tension of the L UE from (+) to (-) in 6 weeks.     Pt will be able to improve  strength to contralateral side in 8 weeks.     Pt will be able to reach behind her back without pain in 6 weeks.  "        Plan  Patient would benefit from: skilled physical therapy  Referral necessary: No  Planned modality interventions: low level laser therapy, cryotherapy, TENS, neuromuscular electric stimulation and thermotherapy: hydrocollator packs  Other planned modality interventions: epat    Planned therapy interventions: abdominal trunk stabilization, IASTM, joint mobilization, kinesiology taping, manual therapy, massage, ADL retraining, Regalado taping, motor coordination training, body mechanics training, breathing training, nerve gliding, neuromuscular re-education, patient/caregiver education, compression, coordination, postural training, dry needling, fine motor coordination training, strengthening, stretching, flexibility, functional ROM exercises, therapeutic activities, therapeutic exercise, therapeutic training and home exercise program    Frequency: 2x week  Duration in weeks: 8  Plan of Care beginning date: 4/10/2025  Plan of Care expiration date: 6/5/2025  Treatment plan discussed with: patient  Plan details: Pt will participate in skilled PT services 2x/week tapering as needed for 8 weeks in order to return to PLOF and meet goals.    Subjective Evaluation    History of Present Illness  Mechanism of injury: Pt presents into PT with chief complaint L elbow pain ongoing for three months. Pt was in the middle of moving residences and it was \"aggravating.\" Pt was utilizing bracing but \"not anymore\" because \"it was too tight\". N/t from neck to elbow into elbows.  History of neck and low back joint issues.  Pt states difficulty with gripping items, dropping items, issues with reaching behind the back, and difficulty sleeping.               Recurrent probem    Quality of life: good    Patient Goals  Patient goals for therapy: increased strength, independence with ADLs/IADLs, decreased pain, decreased edema and increased motion  Patient goal: Pt wants to reduce pain and return to PLOF.  Pain  Current pain rating: " 9  At best pain rating: 3  At worst pain ratin  Location: L elbow  Quality: dull ache, radiating, tight, burning and sharp  Alleviating factors: bracing; ibuprofen prn.  Aggravating factors: overhead activity and lifting  Progression: worsening    Social Support    Employment status: not working  Hand dominance: right      Diagnostic Tests  X-ray: normal  Treatments  No previous or current treatments      Objective     Static Posture   General Observations  Flexed and guarded.     Head  Forward.    Shoulders  Elevated and rounded.    Scapulae  Left elevated.    Tenderness     Left Elbow   Tenderness in the antecubital fossa, lateral epicondyle and medial epicondyle.     Left Wrist/Hand   Tenderness in the lateral epicondyle and medial epicondyle.     Active Range of Motion   Cervical/Thoracic Spine       Cervical  Subcranial protraction:  with pain   Subcranial retraction:   Restriction level: minimal  Flexion:  with pain Restriction level: minimal  Extension:  Restriction level: moderate  Left lateral flexion:  Restriction level: minimal  Right lateral flexion:  with pain Restriction level moderate  Left rotation:  Restriction level: moderate  Right rotation:  Restriction level: moderate    Thoracic    Extension:  Restriction level: moderate    Left Elbow   Normal active range of motion  Flexion: with pain  Extension: with pain  Forearm supination: with pain  Forearm pronation: with pain    Right Elbow   Normal active range of motion  Mechanical Assessment    Cervical    Supine retractation: repeated movements  Pain location: centralized  Pain intensity: better  Pain level: decreased    Thoracic      Lumbar      Strength/Myotome Testing     Left Elbow   Flexion: 4  Extension: 4  Forearm supination: 4  Forearm pronation: 4    Right Elbow   Flexion: 4  Extension: 4  Forearm supination: 4  Forearm pronation: 4    Additional Strength Details   strength position 2  R: 40 lbs   L: 2 lbs    Tests     Left Shoulder    Positive ULTT1, ULTT2, ULTT3 and ULTT4.              Precautions: universal      POC expires Unit limit Auth Expiration date PT/OT/ST + Visit Limit?   6/5/25 bomn 12/31/25 bomn                             Visit/Unit Tracking  AUTH Status:  Date 4/10              yes Used                Remaining                    Manuals             Central nerve glides              IASTM CTE                                       Neuro Re-Ed             Radial nerve glides             Median nerve glides             Ulnar nerve glides             Chin tucks             SNAGS ext                                       Ther Ex             Ecc  strengthening                                                                                                         Ther Activity                                       Gait Training                                       Modalities             Laser

## 2025-04-12 ENCOUNTER — HOSPITAL ENCOUNTER (OUTPATIENT)
Facility: MEDICAL CENTER | Age: 55
Discharge: HOME | End: 2025-04-12
Payer: COMMERCIAL

## 2025-04-12 DIAGNOSIS — M54.16 LUMBAR RADICULOPATHY: ICD-10-CM

## 2025-04-12 PROCEDURE — 72148 MRI LUMBAR SPINE W/O DYE: CPT

## 2025-04-20 ENCOUNTER — TELEPHONE (OUTPATIENT)
Dept: OTHER | Facility: OTHER | Age: 55
End: 2025-04-20

## 2025-04-20 NOTE — TELEPHONE ENCOUNTER
Patient is calling regarding cancelling an appointment.    Date/Time:4/21/2025    Patient was rescheduled: YES [] NO [x]    Patient requesting call back to reschedule: YES [x] NO []

## 2025-04-22 ENCOUNTER — APPOINTMENT (OUTPATIENT)
Age: 55
End: 2025-04-22
Payer: COMMERCIAL

## 2025-04-24 ENCOUNTER — OFFICE VISIT (OUTPATIENT)
Age: 55
End: 2025-04-24
Payer: COMMERCIAL

## 2025-04-24 DIAGNOSIS — M54.12 CERVICAL RADICULOPATHY: Primary | ICD-10-CM

## 2025-04-24 DIAGNOSIS — M25.522 LEFT ELBOW PAIN: ICD-10-CM

## 2025-04-24 PROCEDURE — 97112 NEUROMUSCULAR REEDUCATION: CPT

## 2025-04-24 PROCEDURE — 97140 MANUAL THERAPY 1/> REGIONS: CPT

## 2025-04-24 PROCEDURE — 97110 THERAPEUTIC EXERCISES: CPT

## 2025-04-24 NOTE — PROGRESS NOTES
"Daily Note     Today's date: 2025  Patient name: Bernard Roblero  : 1970  MRN: [id number]  Referring provider: Dixie Foster PA-C  Dx:   Encounter Diagnosis     ICD-10-CM    1. Cervical radiculopathy  M54.12       2. Left elbow pain  M25.522           Start Time: 1318  Stop Time: 1400  Total time in clinic (min): 42 minutes    Subjective: Pt states she was not adherent with hep and is in a \"lot of pain.\" Pt has a lot of stress due to family obligations.       Objective: See treatment diary below      Assessment: Provided mobility both soft tissue and joint to L UE. Pt responded favorably to c/s extension bias with diminished symptoms. Cont to provide scapular strengthening nv. Tolerated treatment well. Patient would benefit from continued PT      Plan: Continue per plan of care.      Precautions: universal      POC expires Unit limit Auth Expiration date PT/OT/ST + Visit Limit?   25 bomn 25 bomn                             Visit/Unit Tracking  AUTH Status:  Date 4/10 4/24             yes Used                Remaining                    Manuals             Central nerve glides  akc            IASTM CTE Ak  MWM elbow ext            PROM L shldr  ak            L 1st rib mobs ak            L C/S side glides C4-C6 ak            Neuro Re-Ed             Radial nerve glides 20x            Median nerve glides 20x            Ulnar nerve glides Hands together  20x            Chin tucks 30x5\"   Supine             SNAGS ext 30x5\"                                       Ther Ex             Ecc  strengthening  Red Therbar  Pasta breakers ecc   20x5\"    90/90 and 180 ea                                                                                                         Ther Activity                                       Gait Training                                       Modalities             Laser CTE L   2' 30\"                              "

## 2025-04-28 ENCOUNTER — OFFICE VISIT (OUTPATIENT)
Age: 55
End: 2025-04-28
Payer: COMMERCIAL

## 2025-04-28 VITALS — HEIGHT: 66 IN | WEIGHT: 171 LBS | BODY MASS INDEX: 27.48 KG/M2

## 2025-04-28 DIAGNOSIS — M51.369 DEGENERATION OF INTERVERTEBRAL DISC OF LUMBAR REGION, UNSPECIFIED WHETHER PAIN PRESENT: ICD-10-CM

## 2025-04-28 DIAGNOSIS — M54.50 CHRONIC RIGHT-SIDED LOW BACK PAIN WITHOUT SCIATICA: Primary | ICD-10-CM

## 2025-04-28 DIAGNOSIS — G89.29 CHRONIC RIGHT-SIDED LOW BACK PAIN WITHOUT SCIATICA: Primary | ICD-10-CM

## 2025-04-28 PROCEDURE — 99203 OFFICE O/P NEW LOW 30 MIN: CPT | Performed by: ORTHOPAEDIC SURGERY

## 2025-04-28 PROCEDURE — 99213 OFFICE O/P EST LOW 20 MIN: CPT | Performed by: ORTHOPAEDIC SURGERY

## 2025-04-28 RX ORDER — CELECOXIB 100 MG/1
CAPSULE ORAL
COMMUNITY
Start: 2025-04-18

## 2025-04-28 RX ORDER — NAPROXEN 500 MG/1
TABLET ORAL
COMMUNITY
Start: 2025-04-20

## 2025-04-28 NOTE — PROGRESS NOTES
"Name: Bernard Roblero      : 1970       MRN: [id number]   Encounter Provider: Erendira Morgan MD   Encounter Date: 25  Encounter department[facility]      History of Present Illness    Bernard Roblero is a 55 y.o. female who presents for initial evaluation of her lumbar spine. Pain has been present for approximately 2 years, with progression of symptom severity over time. The patient reports that their pain is mostly located along the right side of her low back, with radiation to the groin and anterior aspect of the right thigh. Reports numbness and tingling, which follows the same distribution as her pain. Reports occasional weakness into the right leg. Reports occasional use of OTC analgesics, with ibuprofen, for symptomatic management. She reports that she recently participated in physical therapy for her back approximately 2 months ago, which significantly improved her symptoms. Denies history of spinal injections with pain management.  Denies prior history of spine surgery.      Previous pain management care: No  Previous physical therapy in the past 6 months for involved area: No  Diabetic: No No results found for: \"HGBA1C\"  Nicotine use: Yes   BMI: Estimated body mass index is 27.6 kg/m² as calculated from the following:    Height as of this encounter: 5' 6\" (1.676 m).    Weight as of this encounter: 77.6 kg (171 lb).  Blood thinners: None      ALLERGIES: No Known Allergies    MEDICATIONS:    Current Outpatient Medications:     ALPRAZolam (XANAX) 1 mg tablet, Take 1 hour before procedure, Disp: 1 tablet, Rfl: 0    amLODIPine (NORVASC) 5 mg tablet, Take 1 tablet (5 mg total) by mouth daily, Disp: 90 tablet, Rfl: 1    celecoxib (CeleBREX) 100 mg capsule, ????? ??? ????? ????? ????? ?? ?????, Disp: , Rfl:     cholecalciferol (VITAMIN D3) 1,000 units tablet, Take 1,000 Units by mouth daily, Disp: , Rfl:     COLLAGEN PO, Take by " "mouth in the morning, Disp: , Rfl:     cyanocobalamin 1,000 mcg/mL, Inject 1 mL (1,000 mcg total) into a muscle once a week, Disp: 12 mL, Rfl: 1    cyclobenzaprine (FLEXERIL) 5 mg tablet, Take 1 tablet (5 mg total) by mouth 3 (three) times a day as needed for muscle spasms, Disp: 30 tablet, Rfl: 0    Diclofenac Sodium (VOLTAREN) 1 %, Apply 2 g topically 4 (four) times a day, Disp: 90 g, Rfl: 3    diclofenac sodium (VOLTAREN) 50 mg EC tablet, Take 1 tablet (50 mg total) by mouth 2 (two) times a day, Disp: 30 tablet, Rfl: 0    ibuprofen (MOTRIN) 800 mg tablet, TAKE 1 TABLET (800 MG TOTAL) BY MOUTH EVERY 6 (SIX) HOURS AS NEEDED FOR MILD PAIN, Disp: 60 tablet, Rfl: 0    levothyroxine 100 mcg tablet, Take 1 tablet (100 mcg total) by mouth daily, Disp: 120 tablet, Rfl: 0    meloxicam (Mobic) 15 mg tablet, Take 1 tablet (15 mg total) by mouth daily, Disp: 20 tablet, Rfl: 0    naproxen (NAPROSYN) 500 mg tablet, ????? ??? ????? ?? ???? ???? ????? ?? ????? ????? ?? ?????, Disp: , Rfl:     olmesartan (BENICAR) 40 mg tablet, Take 1 tablet (40 mg total) by mouth daily, Disp: 90 tablet, Rfl: 3    SYRINGE-NEEDLE, DISP, 3 ML 25G X 5/8\" 3 ML MISC, Use once a week, Disp: 12 each, Rfl: 1    triamcinolone (KENALOG) 0.5 % cream, Apply topically 3 (three) times a day, Disp: 90 g, Rfl: 1    aspirin 81 mg chewable tablet, Chew 1 tablet (81 mg total) 2 (two) times a day for 14 days Take with food., Disp: 28 tablet, Rfl: 0    benzonatate (TESSALON PERLES) 100 mg capsule, Take 1 capsule (100 mg total) by mouth 3 (three) times a day as needed for cough, Disp: 20 capsule, Rfl: 0    clotrimazole-betamethasone (LOTRISONE) 1-0.05 % cream, Apply topically 2 (two) times a day, Disp: 60 g, Rfl: 0    methylPREDNISolone 4 MG tablet therapy pack, Use as directed on package. Do not take other anti-inflammatory (NSAID) medications while on Medrol dose ally. You should not suddenly stop using these medications. Follow the instructions listed on the " prescription about tapering your dose. (Patient not taking: Reported on 2024), Disp: 21 tablet, Rfl: 0    Omega-3 Fatty Acids (FISH OIL PO), Take by mouth in the morning (Patient not taking: Reported on 3/1/2024), Disp: , Rfl:     ondansetron (Zofran ODT) 4 mg disintegrating tablet, Take 1 tablet (4 mg total) by mouth every 6 (six) hours as needed for nausea or vomiting (Patient not taking: Reported on 10/27/2023), Disp: 12 tablet, Rfl: 0    oxyCODONE (ROXICODONE) 5 immediate release tablet, Take 1 tablet (5 mg total) by mouth every 4 (four) hours as needed for severe pain Max Daily Amount: 30 mg (Patient not taking: Reported on 10/30/2024), Disp: 30 tablet, Rfl: 0    phentermine (ADIPEX-P) 37.5 MG tablet, Take 1 tablet (37.5 mg total) by mouth daily, Disp: 90 tablet, Rfl: 0    rosuvastatin (CRESTOR) 10 MG tablet, Take 1 tablet (10 mg total) by mouth daily, Disp: 100 tablet, Rfl: 3    varenicline (CHANTIX) 1 mg tablet, TAKE 1 TABLET BY MOUTH TWICE A DAY, Disp: 168 tablet, Rfl: 3     PAST MEDICAL HISTORY:   Past Medical History:   Diagnosis Date    Acute medial meniscus tear of left knee     Arthritis     Back pain     Cigarette smoker     Disease of thyroid gland     Hypertension        PAST SURGICAL HISTORY:  Past Surgical History:   Procedure Laterality Date    ABDOMINOPLASTY      ARTHROSCOPIC REPAIR ACL Right     AUGMENTATION MAMMAPLASTY Bilateral 2017    saline    CATARACT EXTRACTION       SECTION      1/3/1991    NE ARTHROSCOPY KNEE W/MENISCUS RPR MEDIAL/LATERAL Left 10/24/2023    Procedure: ARTHROSCOPY KNEE, meniscus root repair;  Surgeon: Erik Mcgill MD;  Location: [facility];  Service: Orthopedics    REPEAT  SECTION      1992, 12/3/1997       SOCIAL HISTORY:  Social History     Tobacco Use   Smoking Status Every Day    Current packs/day: 0.50    Average packs/day: 0.5 packs/day for 33.0 years (16.5 ttl pk-yrs)    Types: Cigarettes    Passive exposure: Current   Smokeless  Tobacco Never   Tobacco Comments    Last cigarette 10/23        Physical Exam    55 y.o. female sitting comfortably on exam chair in no apparent distress.   Ambulates with normal gait, leaning slightly forward  ROM: free and painless ROM of bilateral hips  Patient is able to go up on toes and on heels   Patient is  able to balance on 1 leg   Non-TTP over spinous processes and paraspinal muscles. TTP over right SI joint  Strength RLE: hip flexion 5/5, knee extension 5/5, knee flexion 5/5 , dorsiflexion 5/5, plantar flexion 5/5  Strength LLE: hip flexion 5/5, knee extension 5/5, knee flexion 5/5 , dorsiflexion 5/5, plantar flexion 5/5  Sensation is intact and equal bilaterally in lower extremities  Negative straight leg raise         RADIOGRAPHIC STUDIES:  Xrays, lumbar spine, 12/19/2023: Mild degenerative changes at L3-4, moderate to severe degenerative changes at L4-5 and severe degenerative changes at L5-S1.  No evidence of instability.  MRI, lumbar spine, 4/12/2025: Moderate to severe degenerative disc at L3-4, severe degenerative changes at L4-5 and L5-S1.  No evidence of central or lateral recess stenosis.  Multilevel neuroforaminal stenosis.    Assessment & Plan  Degeneration of intervertebral disc of lumbar region, unspecified whether pain present    Orders:    Ambulatory Referral to Physical Therapy; Future    Chronic right-sided low back pain without sciatica    Orders:    Ambulatory Referral to Physical Therapy; Future           PLAN:  55 y.o. female with lumbar spine pain and lumbar degenerative disc disease.     Recent xray and MRI images of the lumbar spine were reviewed and discussed with the patient during today's visit. Discussed the natural history of chronic low back pain and degenerative disc disease. Potential options for treatment were discussed, as well.     At this time, the best plan of care for the patient is for her to return to formal physical therapy. Discussed that physical therapy can  help to reduce symptom severity and improve function. Patient agreeable to this plan. A referral to PT was provided during today's visit.     Patient is to return in 3 months for re-evaluation.       Scribe Attestation      I,:  Claudia Kent PA-C am acting as a scribe while in the presence of the attending physician.:       I,:  Erendira Morgan MD personally performed the services described in this documentation    as scribed in my presence.:

## 2025-04-29 ENCOUNTER — APPOINTMENT (OUTPATIENT)
Age: 55
End: 2025-04-29
Payer: COMMERCIAL

## 2025-05-07 ENCOUNTER — OFFICE VISIT (OUTPATIENT)
Age: 55
End: 2025-05-07
Payer: COMMERCIAL

## 2025-05-07 ENCOUNTER — APPOINTMENT (OUTPATIENT)
Dept: LAB | Facility: HOSPITAL | Age: 55
End: 2025-05-07
Payer: COMMERCIAL

## 2025-05-07 DIAGNOSIS — M54.12 CERVICAL RADICULOPATHY: Primary | ICD-10-CM

## 2025-05-07 DIAGNOSIS — M25.522 LEFT ELBOW PAIN: ICD-10-CM

## 2025-05-07 DIAGNOSIS — E83.52 HYPERCALCEMIA: ICD-10-CM

## 2025-05-07 LAB — PTH-INTACT SERPL-MCNC: 49 PG/ML (ref 12–88)

## 2025-05-07 PROCEDURE — 97112 NEUROMUSCULAR REEDUCATION: CPT

## 2025-05-07 PROCEDURE — 83970 ASSAY OF PARATHORMONE: CPT

## 2025-05-07 PROCEDURE — 36415 COLL VENOUS BLD VENIPUNCTURE: CPT

## 2025-05-07 PROCEDURE — 97140 MANUAL THERAPY 1/> REGIONS: CPT

## 2025-05-07 PROCEDURE — 97110 THERAPEUTIC EXERCISES: CPT

## 2025-05-07 NOTE — PROGRESS NOTES
"Daily Note     Today's date: 2025  Patient name: Bernard Roblero  : 1970  MRN: [id number]  Referring provider: Dixie Foster PA-C  Dx:   Encounter Diagnosis     ICD-10-CM    1. Cervical radiculopathy  M54.12       2. Left elbow pain  M25.522           Start Time: 1050  Stop Time: 1130  Total time in clinic (min): 40 minutes    Subjective: Pt states she was feeling better after last previous session.       Objective: See treatment diary below      Assessment: Assessed low back mobility in which patient experiences hypomobility of the l/s. Gilet's test (+) on the R. Performed lumbosacral gapping followed by a QL stretch on the R that alleviated symptom presence. Tolerated treatment well. Patient would benefit from continued PT      Plan: Continue per plan of care.      Precautions: universal      POC expires Unit limit Auth Expiration date PT/OT/ST + Visit Limit?   25 bomn 25 bomn                             Visit/Unit Tracking  AUTH Status:  Date 4/10 4/24 5/7            yes Used                Remaining                    Manuals            Central nerve glides  akc            IASTM CTE Akc  MWM elbow ext Akc  MWM  Elbow  ext           PROM L shldr  akc akc           L 1st rib mobs akc akc           L C/S side glides C4-C6 akc            R QL gap/lumbosacral MWM  akc           SOR  akc           Cerv traction+chin tuck  Akc 5\"x10           Neuro Re-Ed            Radial nerve glides 20x            Median nerve glides 20x            Ulnar nerve glides Hands together  20x            Chin tucks 30x5\"   Supine             SNAGS ext 30x5\"  30x5\"           Scap Squeezes  30x5\"                        Ther Ex            Ecc  strengthening  Red Therbar  Pasta breakers ecc   20x5\"    90/90 and 180 ea RB  Pasta breakers ecc   20x5\"    90/90 and 180 ea           Stand QL stretch   10x10\"  R                                                                                       " "  Ther Activity                                       Gait Training                                       Modalities 4/24 5/7           Laser CTE L   2' 30\"            MH  10' pre low back and neck prone                  "

## 2025-05-09 ENCOUNTER — APPOINTMENT (OUTPATIENT)
Age: 55
End: 2025-05-09
Payer: COMMERCIAL

## 2025-05-12 ENCOUNTER — APPOINTMENT (OUTPATIENT)
Age: 55
End: 2025-05-12
Payer: COMMERCIAL

## (undated) DEVICE — COBAN 6 IN STERILE

## (undated) DEVICE — HEWSON SUTURE RETRIEVER: Brand: HEWSON SUTURE RETRIEVER

## (undated) DEVICE — SCD SEQUENTIAL COMPRESSION COMFORT SLEEVE MEDIUM KNEE LENGTH: Brand: KENDALL SCD

## (undated) DEVICE — GLOVE SRG BIOGEL 6.5

## (undated) DEVICE — PADDING CAST 6IN COTTON STRL

## (undated) DEVICE — TIBURON SPLIT SHEET: Brand: CONVERTORS

## (undated) DEVICE — GLOVE INDICATOR PI UNDERGLOVE SZ 8.5 BLUE

## (undated) DEVICE — ASTOUND STANDARD SURGICAL GOWN, XL: Brand: CONVERTORS

## (undated) DEVICE — ABDOMINAL PAD: Brand: DERMACEA

## (undated) DEVICE — SYRINGE 20ML LL

## (undated) DEVICE — CYSTO TUBING TUR Y IRRIGATION

## (undated) DEVICE — GAUZE SPONGES,16 PLY: Brand: CURITY

## (undated) DEVICE — IMPERVIOUS STOCKINETTE: Brand: DEROYAL

## (undated) DEVICE — 4-PORT MANIFOLD: Brand: NEPTUNE 2

## (undated) DEVICE — NEEDLE 18 G X 1 1/2

## (undated) DEVICE — CUTTER FLIPCUTTER III 6-12MM

## (undated) DEVICE — INTENDED FOR TISSUE SEPARATION, AND OTHER PROCEDURES THAT REQUIRE A SHARP SURGICAL BLADE TO PUNCTURE OR CUT.: Brand: BARD-PARKER ® CARBON RIB-BACK BLADES

## (undated) DEVICE — CHLORAPREP HI-LITE 26ML ORANGE

## (undated) DEVICE — TUBING EXTENSION 6 FT CONTIN WAVE

## (undated) DEVICE — GLOVE SRG BIOGEL 7.5

## (undated) DEVICE — NEEDLE SCORPION KNEE

## (undated) DEVICE — 3M™ IOBAN™ 2 ANTIMICROBIAL INCISE DRAPE 6650EZ: Brand: IOBAN™ 2

## (undated) DEVICE — SUT FIBERSTICK #2 50IN BLUE

## (undated) DEVICE — TUBING SUCTION 5MM X 12 FT

## (undated) DEVICE — 3M™ STERI-DRAPE™ U-DRAPE 1015: Brand: STERI-DRAPE™

## (undated) DEVICE — BETHLEHEM UNIVERSAL  ARTHRO PK: Brand: CARDINAL HEALTH

## (undated) DEVICE — PUDDLE VAC

## (undated) DEVICE — GLOVE SRG BIOGEL 8

## (undated) DEVICE — BLADE SHAVER DISSECTOR 4MM 13CM COOLCUT

## (undated) DEVICE — ACE WRAP 6 IN UNSTERILE

## (undated) DEVICE — SUT MONOCRYL 2-0 SH 27 IN Y417H

## (undated) DEVICE — 3M™ STERI-STRIP™ REINFORCED ADHESIVE SKIN CLOSURES, R1546, 1/4 IN X 4 IN (6 MM X 100 MM), 10 STRIPS/ENVELOPE: Brand: 3M™ STERI-STRIP™

## (undated) DEVICE — GLOVE INDICATOR PI UNDERGLOVE SZ 7 BLUE

## (undated) DEVICE — THREADED CLEAR CANNULA WITH OBTURATOR 5.5MM X 75MM

## (undated) DEVICE — 3M™ STERI-STRIP™ REINFORCED ADHESIVE SKIN CLOSURES, R1547, 1/2 IN X 4 IN (12 MM X 100 MM), 6 STRIPS/ENVELOPE: Brand: 3M™ STERI-STRIP™